# Patient Record
Sex: FEMALE | Race: WHITE | NOT HISPANIC OR LATINO
[De-identification: names, ages, dates, MRNs, and addresses within clinical notes are randomized per-mention and may not be internally consistent; named-entity substitution may affect disease eponyms.]

---

## 2017-01-04 ENCOUNTER — APPOINTMENT (OUTPATIENT)
Dept: INTERNAL MEDICINE | Facility: CLINIC | Age: 72
End: 2017-01-04

## 2017-01-30 ENCOUNTER — APPOINTMENT (OUTPATIENT)
Dept: DERMATOLOGY | Facility: CLINIC | Age: 72
End: 2017-01-30

## 2017-05-24 ENCOUNTER — APPOINTMENT (OUTPATIENT)
Dept: INTERNAL MEDICINE | Facility: CLINIC | Age: 72
End: 2017-05-24

## 2017-07-31 ENCOUNTER — APPOINTMENT (OUTPATIENT)
Dept: DERMATOLOGY | Facility: CLINIC | Age: 72
End: 2017-07-31

## 2017-08-07 ENCOUNTER — APPOINTMENT (OUTPATIENT)
Dept: INTERNAL MEDICINE | Facility: CLINIC | Age: 72
End: 2017-08-07
Payer: MEDICARE

## 2017-08-07 PROCEDURE — 99215 OFFICE O/P EST HI 40 MIN: CPT

## 2017-09-19 ENCOUNTER — APPOINTMENT (OUTPATIENT)
Dept: INTERNAL MEDICINE | Facility: CLINIC | Age: 72
End: 2017-09-19
Payer: MEDICARE

## 2017-09-19 PROCEDURE — 93000 ELECTROCARDIOGRAM COMPLETE: CPT

## 2017-09-19 PROCEDURE — G0008: CPT

## 2017-09-19 PROCEDURE — 99215 OFFICE O/P EST HI 40 MIN: CPT | Mod: 25

## 2017-09-19 PROCEDURE — 94010 BREATHING CAPACITY TEST: CPT

## 2017-09-19 PROCEDURE — G0439: CPT

## 2017-09-19 PROCEDURE — 90686 IIV4 VACC NO PRSV 0.5 ML IM: CPT

## 2017-09-19 PROCEDURE — 36415 COLL VENOUS BLD VENIPUNCTURE: CPT

## 2017-09-22 ENCOUNTER — APPOINTMENT (OUTPATIENT)
Dept: DERMATOLOGY | Facility: CLINIC | Age: 72
End: 2017-09-22
Payer: MEDICARE

## 2017-09-22 PROCEDURE — 99214 OFFICE O/P EST MOD 30 MIN: CPT

## 2018-03-23 ENCOUNTER — RESULT REVIEW (OUTPATIENT)
Age: 73
End: 2018-03-23

## 2018-03-23 ENCOUNTER — APPOINTMENT (OUTPATIENT)
Dept: DERMATOLOGY | Facility: CLINIC | Age: 73
End: 2018-03-23
Payer: MEDICARE

## 2018-03-23 PROCEDURE — 11100 BX SKIN SUBCUTANEOUS&/MUCOUS MEMBRANE 1 LESION: CPT

## 2018-03-23 PROCEDURE — 99213 OFFICE O/P EST LOW 20 MIN: CPT | Mod: 25

## 2018-03-29 ENCOUNTER — APPOINTMENT (OUTPATIENT)
Dept: INTERNAL MEDICINE | Facility: CLINIC | Age: 73
End: 2018-03-29
Payer: MEDICARE

## 2018-03-29 PROCEDURE — G0009: CPT

## 2018-03-29 PROCEDURE — 90732 PPSV23 VACC 2 YRS+ SUBQ/IM: CPT

## 2018-03-29 PROCEDURE — 99214 OFFICE O/P EST MOD 30 MIN: CPT | Mod: 25

## 2018-03-29 PROCEDURE — 36415 COLL VENOUS BLD VENIPUNCTURE: CPT

## 2018-08-10 ENCOUNTER — RESULT REVIEW (OUTPATIENT)
Age: 73
End: 2018-08-10

## 2018-09-26 ENCOUNTER — APPOINTMENT (OUTPATIENT)
Dept: DERMATOLOGY | Facility: CLINIC | Age: 73
End: 2018-09-26
Payer: MEDICARE

## 2018-09-26 PROCEDURE — 99214 OFFICE O/P EST MOD 30 MIN: CPT

## 2018-10-10 ENCOUNTER — APPOINTMENT (OUTPATIENT)
Dept: INTERNAL MEDICINE | Facility: CLINIC | Age: 73
End: 2018-10-10
Payer: MEDICARE

## 2018-10-10 VITALS
HEIGHT: 65 IN | WEIGHT: 169 LBS | DIASTOLIC BLOOD PRESSURE: 80 MMHG | BODY MASS INDEX: 28.16 KG/M2 | SYSTOLIC BLOOD PRESSURE: 120 MMHG

## 2018-10-10 DIAGNOSIS — R53.83 OTHER FATIGUE: ICD-10-CM

## 2018-10-10 PROCEDURE — 90662 IIV NO PRSV INCREASED AG IM: CPT

## 2018-10-10 PROCEDURE — G0008: CPT

## 2018-10-10 PROCEDURE — 99215 OFFICE O/P EST HI 40 MIN: CPT | Mod: 25

## 2018-10-10 NOTE — PLAN
[FreeTextEntry1] : PT HERE FOR FOLLOW UP ON BP ND CHOL WILL CHECK LBS  FTGFIUE CHECK THYROID FXN\par NEEDS BONE DENSITY\par FLU SHOT HIGH DOSE GIVNE

## 2018-10-10 NOTE — HISTORY OF PRESENT ILLNESS
[FreeTextEntry1] : F/U  on  B/P [de-identified] : PT HERE FOR FOLLOW UP FEELS OK NO TIO R ComplainT s \par HAS SEVERAL ISUES ON HER LIST TODAY\par

## 2018-10-11 LAB
25(OH)D3 SERPL-MCNC: 32.9 NG/ML
ALBUMIN SERPL ELPH-MCNC: 4.4 G/DL
ALP BLD-CCNC: 69 U/L
ALT SERPL-CCNC: 13 U/L
ANION GAP SERPL CALC-SCNC: 13 MMOL/L
AST SERPL-CCNC: 19 U/L
BASOPHILS # BLD AUTO: 0.02 K/UL
BASOPHILS NFR BLD AUTO: 0.3 %
BILIRUB SERPL-MCNC: 0.3 MG/DL
BUN SERPL-MCNC: 26 MG/DL
CALCIUM SERPL-MCNC: 9.7 MG/DL
CHLORIDE SERPL-SCNC: 104 MMOL/L
CHOLEST SERPL-MCNC: 192 MG/DL
CHOLEST/HDLC SERPL: 3 RATIO
CO2 SERPL-SCNC: 27 MMOL/L
CREAT SERPL-MCNC: 0.72 MG/DL
EOSINOPHIL # BLD AUTO: 0.1 K/UL
EOSINOPHIL NFR BLD AUTO: 1.4 %
GLUCOSE SERPL-MCNC: 87 MG/DL
HBA1C MFR BLD HPLC: 5.6 %
HCT VFR BLD CALC: 43.9 %
HDLC SERPL-MCNC: 65 MG/DL
HGB BLD-MCNC: 14.3 G/DL
IMM GRANULOCYTES NFR BLD AUTO: 0.1 %
LDLC SERPL CALC-MCNC: 106 MG/DL
LYMPHOCYTES # BLD AUTO: 1.56 K/UL
LYMPHOCYTES NFR BLD AUTO: 22.6 %
MAN DIFF?: NORMAL
MCHC RBC-ENTMCNC: 31.5 PG
MCHC RBC-ENTMCNC: 32.6 GM/DL
MCV RBC AUTO: 96.7 FL
MONOCYTES # BLD AUTO: 0.27 K/UL
MONOCYTES NFR BLD AUTO: 3.9 %
NEUTROPHILS # BLD AUTO: 4.95 K/UL
NEUTROPHILS NFR BLD AUTO: 71.7 %
PLATELET # BLD AUTO: 244 K/UL
POTASSIUM SERPL-SCNC: 4.4 MMOL/L
PROT SERPL-MCNC: 6.7 G/DL
RBC # BLD: 4.54 M/UL
RBC # FLD: 13.6 %
SODIUM SERPL-SCNC: 144 MMOL/L
TRIGL SERPL-MCNC: 107 MG/DL
WBC # FLD AUTO: 6.91 K/UL

## 2018-10-14 LAB
T4 SERPL-MCNC: 6.9 UG/DL
TSH SERPL-ACNC: 1.57 UIU/ML

## 2018-11-07 ENCOUNTER — APPOINTMENT (OUTPATIENT)
Dept: PULMONOLOGY | Facility: CLINIC | Age: 73
End: 2018-11-07
Payer: MEDICARE

## 2018-11-07 VITALS
BODY MASS INDEX: 29.23 KG/M2 | HEART RATE: 89 BPM | DIASTOLIC BLOOD PRESSURE: 80 MMHG | WEIGHT: 165 LBS | HEIGHT: 63 IN | OXYGEN SATURATION: 95 % | SYSTOLIC BLOOD PRESSURE: 130 MMHG

## 2018-11-07 DIAGNOSIS — C44.319 BASAL CELL CARCINOMA OF SKIN OF OTHER PARTS OF FACE: ICD-10-CM

## 2018-11-07 DIAGNOSIS — M25.569 PAIN IN UNSPECIFIED KNEE: ICD-10-CM

## 2018-11-07 PROCEDURE — 99204 OFFICE O/P NEW MOD 45 MIN: CPT | Mod: 25

## 2018-11-07 PROCEDURE — 94060 EVALUATION OF WHEEZING: CPT

## 2018-11-07 PROCEDURE — 94664 DEMO&/EVAL PT USE INHALER: CPT | Mod: 59

## 2018-11-27 ENCOUNTER — LABORATORY RESULT (OUTPATIENT)
Age: 73
End: 2018-11-27

## 2018-11-29 LAB
BASOPHILS # BLD AUTO: 0.03 K/UL
BASOPHILS NFR BLD AUTO: 0.5 %
EOSINOPHIL # BLD AUTO: 0.19 K/UL
EOSINOPHIL NFR BLD AUTO: 2.9 %
HCT VFR BLD CALC: 43 %
HGB BLD-MCNC: 13.9 G/DL
IMM GRANULOCYTES NFR BLD AUTO: 0.2 %
LYMPHOCYTES # BLD AUTO: 1.98 K/UL
LYMPHOCYTES NFR BLD AUTO: 30 %
MAN DIFF?: NORMAL
MCHC RBC-ENTMCNC: 31.5 PG
MCHC RBC-ENTMCNC: 32.3 GM/DL
MCV RBC AUTO: 97.5 FL
MONOCYTES # BLD AUTO: 0.33 K/UL
MONOCYTES NFR BLD AUTO: 5 %
NEUTROPHILS # BLD AUTO: 4.05 K/UL
NEUTROPHILS NFR BLD AUTO: 61.4 %
PLATELET # BLD AUTO: 254 K/UL
RBC # BLD: 4.41 M/UL
RBC # FLD: 14.1 %
WBC # FLD AUTO: 6.59 K/UL

## 2018-12-03 LAB
A ALTERNATA IGE QN: <0.1 KUA/L
A FLAVUS AB FLD QL: NEGATIVE
A FUMIGATUS AB FLD QL: NEGATIVE
A FUMIGATUS IGE QN: <0.1 KUA/L
A NIGER AB FLD QL: NEGATIVE
BERMUDA GRASS IGE QN: 0.19 KUA/L
BOXELDER IGE QN: 0.18 KUA/L
C HERBARUM IGE QN: <0.1 KUA/L
CALIF WALNUT IGE QN: <0.1 KUA/L
CAT DANDER IGE QN: 0.81 KUA/L
CMN PIGWEED IGE QN: <0.1 KUA/L
COMMON RAGWEED IGE QN: <0.1 KUA/L
COTTONWOOD IGE QN: <0.1 KUA/L
D FARINAE IGE QN: 7.29 KUA/L
D PTERONYSS IGE QN: 7.84 KUA/L
DEPRECATED A ALTERNATA IGE RAST QL: 0
DEPRECATED A FUMIGATUS IGE RAST QL: 0
DEPRECATED BERMUDA GRASS IGE RAST QL: NORMAL
DEPRECATED BOXELDER IGE RAST QL: NORMAL
DEPRECATED C HERBARUM IGE RAST QL: 0
DEPRECATED CAT DANDER IGE RAST QL: ABNORMAL
DEPRECATED COMMON PIGWEED IGE RAST QL: 0
DEPRECATED COMMON RAGWEED IGE RAST QL: 0
DEPRECATED COTTONWOOD IGE RAST QL: 0
DEPRECATED D FARINAE IGE RAST QL: ABNORMAL
DEPRECATED D PTERONYSS IGE RAST QL: ABNORMAL
DEPRECATED DOG DANDER IGE RAST QL: ABNORMAL
DEPRECATED GOOSEFOOT IGE RAST QL: 0
DEPRECATED LONDON PLANE IGE RAST QL: 0
DEPRECATED MUGWORT IGE RAST QL: 0
DEPRECATED P NOTATUM IGE RAST QL: 0
DEPRECATED RED CEDAR IGE RAST QL: 0
DEPRECATED ROACH IGE RAST QL: 0
DEPRECATED SHEEP SORREL IGE RAST QL: 0
DEPRECATED SILVER BIRCH IGE RAST QL: ABNORMAL
DEPRECATED TIMOTHY IGE RAST QL: ABNORMAL
DEPRECATED WHITE ASH IGE RAST QL: 0
DEPRECATED WHITE OAK IGE RAST QL: ABNORMAL
DOG DANDER IGE QN: 1.41 KUA/L
GOOSEFOOT IGE QN: <0.1 KUA/L
LONDON PLANE IGE QN: <0.1 KUA/L
MUGWORT IGE QN: <0.1 KUA/L
MULBERRY (T70) CLASS: 0
MULBERRY (T70) CONC: <0.1 KUA/L
P NOTATUM IGE QN: <0.1 KUA/L
RED CEDAR IGE QN: <0.1 KUA/L
ROACH IGE QN: <0.1 KUA/L
SHEEP SORREL IGE QN: <0.1 KUA/L
SILVER BIRCH IGE QN: 1.4 KUA/L
TIMOTHY IGE QN: 0.61 KUA/L
TOTAL IGE SMQN RAST: 93 KU/L
TREE ALLERG MIX1 IGE QL: 0
WHITE ASH IGE QN: <0.1 KUA/L
WHITE ELM IGE QN: 0
WHITE ELM IGE QN: <0.1 KUA/L
WHITE OAK IGE QN: 0.98 KUA/L

## 2019-01-08 ENCOUNTER — APPOINTMENT (OUTPATIENT)
Dept: PULMONOLOGY | Facility: CLINIC | Age: 74
End: 2019-01-08
Payer: MEDICARE

## 2019-01-08 VITALS
HEART RATE: 86 BPM | SYSTOLIC BLOOD PRESSURE: 142 MMHG | BODY MASS INDEX: 29.94 KG/M2 | OXYGEN SATURATION: 95 % | DIASTOLIC BLOOD PRESSURE: 82 MMHG | WEIGHT: 169 LBS

## 2019-01-08 PROCEDURE — 99214 OFFICE O/P EST MOD 30 MIN: CPT

## 2019-01-08 NOTE — HISTORY OF PRESENT ILLNESS
[FreeTextEntry1] : Feeling well. No further wheezing and cough. No SOB. On symbicort BID.\par \par Usually has more difficulty in cold weather with bronchitis episodes. \par \par Received cxr just now that she had on 10/18/18: peribronchial thickening.\par \par Allergy testing reviewed below. She has a dog.

## 2019-01-08 NOTE — CONSULT LETTER
[Dear  ___] : Dear  [unfilled], [Courtesy Letter:] : I had the pleasure of seeing your patient, [unfilled], in my office today. [Consult Closing:] : Thank you very much for allowing me to participate in the care of this patient.  If you have any questions, please do not hesitate to contact me.

## 2019-01-08 NOTE — PHYSICAL EXAM
[General Appearance - In No Acute Distress] : no acute distress [Normal Conjunctiva] : the conjunctiva exhibited no abnormalities [Normal Oropharynx] : normal oropharynx [Neck Appearance] : the appearance of the neck was normal [] : the neck was supple [Heart Rate And Rhythm] : heart rate and rhythm were normal [Heart Sounds] : normal S1 and S2 [Abnormal Walk] : normal gait [Nail Clubbing] : no clubbing of the fingernails [Cyanosis, Localized] : no localized cyanosis [Cranial Nerves] : cranial nerves 2-12 were intact [Oriented To Time, Place, And Person] : oriented to person, place, and time [Impaired Insight] : insight and judgment were intact [Affect] : the affect was normal [Rate ___] : at [unfilled] breaths per minute [Clear Bilaterally] : the lungs were clear to auscultation bilaterally [Normal Breath Sounds] : normal bilateral breath sounds

## 2019-01-08 NOTE — ASSESSMENT
[FreeTextEntry1] : Asthma exacerbated by allergies as she is allergic to multiple stimuli. Has a dog at home. Also possibly post viral this Fall. Doing well now. Reports more issues during cold weather.

## 2019-03-25 ENCOUNTER — APPOINTMENT (OUTPATIENT)
Dept: INTERNAL MEDICINE | Facility: CLINIC | Age: 74
End: 2019-03-25
Payer: MEDICARE

## 2019-03-25 VITALS
WEIGHT: 138 LBS | DIASTOLIC BLOOD PRESSURE: 79 MMHG | HEART RATE: 81 BPM | HEIGHT: 63 IN | SYSTOLIC BLOOD PRESSURE: 162 MMHG | BODY MASS INDEX: 24.45 KG/M2

## 2019-03-25 DIAGNOSIS — J01.10 ACUTE FRONTAL SINUSITIS, UNSPECIFIED: ICD-10-CM

## 2019-03-25 PROCEDURE — 99214 OFFICE O/P EST MOD 30 MIN: CPT

## 2019-03-25 NOTE — REVIEW OF SYSTEMS
[Nasal Discharge] : nasal discharge [Postnasal Drip] : postnasal drip [Headache] : headache [Negative] : Heme/Lymph

## 2019-03-25 NOTE — HISTORY OF PRESENT ILLNESS
[FreeTextEntry1] : BP CHECK /HEADACHES [de-identified] : Ms. ELIZABETH GAGNON is a 73 year female with a PMH of asthma and hld comes to the office x 2 weeks of frontal headache. no other complaints at this time.

## 2019-03-25 NOTE — PHYSICAL EXAM

## 2019-03-25 NOTE — PLAN
[FreeTextEntry1] : In regards to patients frontal headache likely frontal sinusitis will start antibiotics and Flonase. \par \par BP slightly elevated in office, will re-check in 2-3 weeks\par \par Counseling included abnormal lab results, differential diagnoses, treatment options, risks and benefits, lifestyle changes, prognosis, current condition, medications, and dose adjustments. \par The patient was interactive, attentive, asked questions, and verbalized understanding

## 2019-03-27 ENCOUNTER — APPOINTMENT (OUTPATIENT)
Dept: DERMATOLOGY | Facility: CLINIC | Age: 74
End: 2019-03-27
Payer: MEDICARE

## 2019-03-27 PROCEDURE — 99214 OFFICE O/P EST MOD 30 MIN: CPT

## 2019-04-15 ENCOUNTER — NON-APPOINTMENT (OUTPATIENT)
Age: 74
End: 2019-04-15

## 2019-04-15 ENCOUNTER — APPOINTMENT (OUTPATIENT)
Dept: INTERNAL MEDICINE | Facility: CLINIC | Age: 74
End: 2019-04-15
Payer: MEDICARE

## 2019-04-15 VITALS
HEART RATE: 71 BPM | OXYGEN SATURATION: 98 % | HEIGHT: 63 IN | WEIGHT: 170 LBS | TEMPERATURE: 98.7 F | SYSTOLIC BLOOD PRESSURE: 134 MMHG | DIASTOLIC BLOOD PRESSURE: 84 MMHG | BODY MASS INDEX: 30.12 KG/M2

## 2019-04-15 PROCEDURE — 93000 ELECTROCARDIOGRAM COMPLETE: CPT

## 2019-04-15 PROCEDURE — 99213 OFFICE O/P EST LOW 20 MIN: CPT | Mod: 25

## 2019-04-15 PROCEDURE — G0447 BEHAVIOR COUNSEL OBESITY 15M: CPT | Mod: 59

## 2019-04-15 PROCEDURE — 36415 COLL VENOUS BLD VENIPUNCTURE: CPT

## 2019-04-15 PROCEDURE — G0439: CPT

## 2019-04-15 PROCEDURE — G0444 DEPRESSION SCREEN ANNUAL: CPT | Mod: 59

## 2019-04-15 RX ORDER — DOXYCYCLINE HYCLATE 100 MG/1
100 CAPSULE ORAL
Qty: 14 | Refills: 0 | Status: DISCONTINUED | COMMUNITY
Start: 2019-03-25 | End: 2019-04-15

## 2019-04-15 NOTE — HISTORY OF PRESENT ILLNESS
[FreeTextEntry1] : physical [de-identified] : Ms. ELIZABETH GAGNON is a 73 year female with a PMH of asthma and hld comes to the office for Physcial exam. Patient feels well and has no complaints at this time.

## 2019-04-15 NOTE — PHYSICAL EXAM
[No Acute Distress] : no acute distress [Well Nourished] : well nourished [Well Developed] : well developed [Well-Appearing] : well-appearing [Normal Sclera/Conjunctiva] : normal sclera/conjunctiva [PERRL] : pupils equal round and reactive to light [EOMI] : extraocular movements intact [Normal Outer Ear/Nose] : the outer ears and nose were normal in appearance [Normal Oropharynx] : the oropharynx was normal [No JVD] : no jugular venous distention [Supple] : supple [No Lymphadenopathy] : no lymphadenopathy [Thyroid Normal, No Nodules] : the thyroid was normal and there were no nodules present [No Respiratory Distress] : no respiratory distress  [Clear to Auscultation] : lungs were clear to auscultation bilaterally [No Accessory Muscle Use] : no accessory muscle use [Normal Rate] : normal rate  [Normal S1, S2] : normal S1 and S2 [Regular Rhythm] : with a regular rhythm [No Murmur] : no murmur heard [No Carotid Bruits] : no carotid bruits [No Abdominal Bruit] : a ~M bruit was not heard ~T in the abdomen [Pedal Pulses Present] : the pedal pulses are present [No Varicosities] : no varicosities [No Edema] : there was no peripheral edema [No Extremity Clubbing/Cyanosis] : no extremity clubbing/cyanosis [No Palpable Aorta] : no palpable aorta [Soft] : abdomen soft [Non Tender] : non-tender [Non-distended] : non-distended [No Masses] : no abdominal mass palpated [No HSM] : no HSM [Normal Bowel Sounds] : normal bowel sounds [Normal Posterior Cervical Nodes] : no posterior cervical lymphadenopathy [No CVA Tenderness] : no CVA  tenderness [Normal Anterior Cervical Nodes] : no anterior cervical lymphadenopathy [No Spinal Tenderness] : no spinal tenderness [No Joint Swelling] : no joint swelling [Grossly Normal Strength/Tone] : grossly normal strength/tone [No Rash] : no rash [Normal Gait] : normal gait [Coordination Grossly Intact] : coordination grossly intact [No Focal Deficits] : no focal deficits [Normal Affect] : the affect was normal [Normal Insight/Judgement] : insight and judgment were intact

## 2019-04-15 NOTE — PLAN
[FreeTextEntry1] : In regards to patients Physical exam, routine blood work drawn, will review results with patient. EKG reviewed in office\par \par Counseling included abnormal lab results, differential diagnoses, treatment options, risks and benefits, lifestyle changes, prognosis, current condition, medications, and dose adjustments. \par The patient was interactive, attentive, asked questions, and verbalized understanding

## 2019-04-15 NOTE — COUNSELING
[Weight management counseling provided] : Weight management [Healthy eating counseling provided] : healthy eating [Activity counseling provided] : activity [Good understanding] : Patient has a good understanding of disease, goals and obesity follow-up plan [ - Behavioral Counseling for Obesity (Face-to-Face for 15 Minutes)] : Behavioral Counseling for Obesity (Face-to-Face for 15 Minutes) [ - Annual Depression Screening] : Annual Depression Screening

## 2019-04-15 NOTE — HEALTH RISK ASSESSMENT
[Good] : ~his/her~  mood as  good [No falls in past year] : Patient reported no falls in the past year [0] : 2) Feeling down, depressed, or hopeless: Not at all (0) [Patient declined Retinal Exam] : Patient declined Retinal Exam. [Patient declined Low Dose CT Scan] : Patient declined Low Dose CT Scan [Patient reported PAP Smear was normal] : Patient reported PAP Smear was normal [Patient reported mammogram was normal] : Patient reported mammogram was normal [Patient reported bone density results were normal] : Patient reported bone density results were normal [Patient reported colonoscopy was normal] : Patient reported colonoscopy was normal [HIV test declined] : HIV test declined [Hepatitis C test declined] : Hepatitis C test declined [Reviewed updated] : Reviewed updated [Designated Healthcare Proxy] : Designated healthcare proxy [Name: ___] : Health Care Proxy's Name: [unfilled]  [Aggressive treatment] : aggressive treatment [I will adhere to the patient's wishes as expressed in the advance directive except as noted below.] : I will adhere to the patient's wishes as expressed in the advance directive except as noted below [de-identified] : Pulmonologist [] : No [[id number]] : 0 [MammogramDate] : 08/18 [PapSmearDate] : 07/17 [BoneDensityDate] : 10/18 [ColonoscopyDate] : 05/12 [AdvancecareDate] : 04/19

## 2019-04-16 LAB
25(OH)D3 SERPL-MCNC: 60.8 NG/ML
ALBUMIN SERPL ELPH-MCNC: 4.3 G/DL
ALP BLD-CCNC: 74 U/L
ALT SERPL-CCNC: 19 U/L
ANION GAP SERPL CALC-SCNC: 12 MMOL/L
AST SERPL-CCNC: 25 U/L
BASOPHILS # BLD AUTO: 0.04 K/UL
BASOPHILS NFR BLD AUTO: 0.7 %
BILIRUB SERPL-MCNC: 0.4 MG/DL
BUN SERPL-MCNC: 19 MG/DL
CALCIUM SERPL-MCNC: 9.6 MG/DL
CHLORIDE SERPL-SCNC: 103 MMOL/L
CHOLEST SERPL-MCNC: 212 MG/DL
CHOLEST/HDLC SERPL: 2.9 RATIO
CO2 SERPL-SCNC: 27 MMOL/L
CREAT SERPL-MCNC: 0.78 MG/DL
EOSINOPHIL # BLD AUTO: 0.17 K/UL
EOSINOPHIL NFR BLD AUTO: 3 %
ESTIMATED AVERAGE GLUCOSE: 114 MG/DL
GLUCOSE SERPL-MCNC: 91 MG/DL
HBA1C MFR BLD HPLC: 5.6 %
HCT VFR BLD CALC: 44.1 %
HDLC SERPL-MCNC: 73 MG/DL
HGB BLD-MCNC: 14 G/DL
IMM GRANULOCYTES NFR BLD AUTO: 0.4 %
LDLC SERPL CALC-MCNC: 120 MG/DL
LYMPHOCYTES # BLD AUTO: 1.51 K/UL
LYMPHOCYTES NFR BLD AUTO: 26.5 %
MAN DIFF?: NORMAL
MCHC RBC-ENTMCNC: 30.8 PG
MCHC RBC-ENTMCNC: 31.7 GM/DL
MCV RBC AUTO: 97.1 FL
MONOCYTES # BLD AUTO: 0.39 K/UL
MONOCYTES NFR BLD AUTO: 6.8 %
NEUTROPHILS # BLD AUTO: 3.57 K/UL
NEUTROPHILS NFR BLD AUTO: 62.6 %
PLATELET # BLD AUTO: 232 K/UL
POTASSIUM SERPL-SCNC: 4.4 MMOL/L
PROT SERPL-MCNC: 6.7 G/DL
RBC # BLD: 4.54 M/UL
RBC # FLD: 13.2 %
SODIUM SERPL-SCNC: 142 MMOL/L
TRIGL SERPL-MCNC: 94 MG/DL
TSH SERPL-ACNC: 1.41 UIU/ML
WBC # FLD AUTO: 5.7 K/UL

## 2019-04-22 ENCOUNTER — RX RENEWAL (OUTPATIENT)
Age: 74
End: 2019-04-22

## 2019-05-17 ENCOUNTER — APPOINTMENT (OUTPATIENT)
Dept: PULMONOLOGY | Facility: CLINIC | Age: 74
End: 2019-05-17
Payer: MEDICARE

## 2019-05-17 VITALS
OXYGEN SATURATION: 96 % | SYSTOLIC BLOOD PRESSURE: 120 MMHG | DIASTOLIC BLOOD PRESSURE: 70 MMHG | WEIGHT: 173 LBS | BODY MASS INDEX: 30.65 KG/M2 | HEART RATE: 74 BPM | HEIGHT: 63 IN

## 2019-05-17 DIAGNOSIS — J45.909 UNSPECIFIED ASTHMA, UNCOMPLICATED: ICD-10-CM

## 2019-05-17 PROCEDURE — 99214 OFFICE O/P EST MOD 30 MIN: CPT

## 2019-08-08 ENCOUNTER — MEDICATION RENEWAL (OUTPATIENT)
Age: 74
End: 2019-08-08

## 2019-10-08 ENCOUNTER — TRANSCRIPTION ENCOUNTER (OUTPATIENT)
Age: 74
End: 2019-10-08

## 2019-10-08 ENCOUNTER — APPOINTMENT (OUTPATIENT)
Dept: INTERNAL MEDICINE | Facility: CLINIC | Age: 74
End: 2019-10-08
Payer: MEDICARE

## 2019-10-08 VITALS
BODY MASS INDEX: 30.65 KG/M2 | SYSTOLIC BLOOD PRESSURE: 138 MMHG | HEIGHT: 63 IN | WEIGHT: 173 LBS | DIASTOLIC BLOOD PRESSURE: 80 MMHG

## 2019-10-08 PROCEDURE — 99214 OFFICE O/P EST MOD 30 MIN: CPT | Mod: 25

## 2019-10-08 PROCEDURE — 36415 COLL VENOUS BLD VENIPUNCTURE: CPT

## 2019-10-08 PROCEDURE — 90686 IIV4 VACC NO PRSV 0.5 ML IM: CPT

## 2019-10-08 PROCEDURE — G0008: CPT

## 2019-10-08 RX ORDER — FLUTICASONE PROPIONATE 50 UG/1
50 SPRAY, METERED NASAL TWICE DAILY
Qty: 16 | Refills: 0 | Status: COMPLETED | COMMUNITY
Start: 2019-03-25 | End: 2019-10-08

## 2019-10-08 NOTE — HISTORY OF PRESENT ILLNESS
[FreeTextEntry1] : bp check / flu shot [de-identified] : Ms. ELIZABETH GAGNON is a 73 year female with a PMH of asthma and hld comes to the office for follow up of BP and Flu shot. Patient feels well and has no complaints at this time.

## 2019-10-08 NOTE — PHYSICAL EXAM
[No Acute Distress] : no acute distress [Well Nourished] : well nourished [Well Developed] : well developed [Well-Appearing] : well-appearing [Normal Sclera/Conjunctiva] : normal sclera/conjunctiva [PERRL] : pupils equal round and reactive to light [EOMI] : extraocular movements intact [Normal Outer Ear/Nose] : the outer ears and nose were normal in appearance [Normal Oropharynx] : the oropharynx was normal [No JVD] : no jugular venous distention [Supple] : supple [No Lymphadenopathy] : no lymphadenopathy [Thyroid Normal, No Nodules] : the thyroid was normal and there were no nodules present [No Accessory Muscle Use] : no accessory muscle use [No Respiratory Distress] : no respiratory distress  [Clear to Auscultation] : lungs were clear to auscultation bilaterally [Normal Rate] : normal rate  [Regular Rhythm] : with a regular rhythm [Normal S1, S2] : normal S1 and S2 [No Murmur] : no murmur heard [No Carotid Bruits] : no carotid bruits [No Abdominal Bruit] : a ~M bruit was not heard ~T in the abdomen [No Varicosities] : no varicosities [Pedal Pulses Present] : the pedal pulses are present [No Edema] : there was no peripheral edema [No Palpable Aorta] : no palpable aorta [Soft] : abdomen soft [No Extremity Clubbing/Cyanosis] : no extremity clubbing/cyanosis [Non Tender] : non-tender [Non-distended] : non-distended [No Masses] : no abdominal mass palpated [No HSM] : no HSM [Normal Bowel Sounds] : normal bowel sounds [Normal Posterior Cervical Nodes] : no posterior cervical lymphadenopathy [Normal Anterior Cervical Nodes] : no anterior cervical lymphadenopathy [No CVA Tenderness] : no CVA  tenderness [No Spinal Tenderness] : no spinal tenderness [No Joint Swelling] : no joint swelling [Grossly Normal Strength/Tone] : grossly normal strength/tone [No Rash] : no rash [Coordination Grossly Intact] : coordination grossly intact [No Focal Deficits] : no focal deficits [Normal Gait] : normal gait [Normal Insight/Judgement] : insight and judgment were intact [Normal Affect] : the affect was normal

## 2019-10-08 NOTE — PLAN
[FreeTextEntry1] : patient's BP well controlled with current medication. will continue current  regimen \par \par In regards to patient's HLD, lifestyle modifications discussed will retest lipids in 6 months. \par \par Patient received flu vaccine today. Patient advised of adverse effects of medication including but not limited to muscle soreness at site of injection and general malaise \par \par Counseling included abnormal lab results, differential diagnoses, treatment options, risks and benefits, lifestyle changes, prognosis, current condition, medications, and dose adjustments. \par The patient was interactive, attentive, asked questions, and verbalized understanding

## 2019-10-09 LAB
CHOLEST SERPL-MCNC: 230 MG/DL
CHOLEST/HDLC SERPL: 3.3 RATIO
HDLC SERPL-MCNC: 69 MG/DL
LDLC SERPL CALC-MCNC: 136 MG/DL
TRIGL SERPL-MCNC: 125 MG/DL

## 2019-10-21 ENCOUNTER — APPOINTMENT (OUTPATIENT)
Dept: DERMATOLOGY | Facility: CLINIC | Age: 74
End: 2019-10-21
Payer: MEDICARE

## 2019-10-21 PROCEDURE — 99213 OFFICE O/P EST LOW 20 MIN: CPT

## 2019-10-29 ENCOUNTER — APPOINTMENT (OUTPATIENT)
Dept: PULMONOLOGY | Facility: CLINIC | Age: 74
End: 2019-10-29
Payer: MEDICARE

## 2019-10-29 VITALS
DIASTOLIC BLOOD PRESSURE: 70 MMHG | SYSTOLIC BLOOD PRESSURE: 130 MMHG | HEART RATE: 70 BPM | HEIGHT: 63 IN | OXYGEN SATURATION: 98 % | BODY MASS INDEX: 31.01 KG/M2 | WEIGHT: 175 LBS

## 2019-10-29 DIAGNOSIS — R06.2 WHEEZING: ICD-10-CM

## 2019-10-29 DIAGNOSIS — R93.89 ABNORMAL FINDINGS ON DIAGNOSTIC IMAGING OF OTHER SPECIFIED BODY STRUCTURES: ICD-10-CM

## 2019-10-29 PROCEDURE — 99214 OFFICE O/P EST MOD 30 MIN: CPT | Mod: 25

## 2019-10-29 PROCEDURE — 94010 BREATHING CAPACITY TEST: CPT

## 2020-02-27 ENCOUNTER — APPOINTMENT (OUTPATIENT)
Dept: PULMONOLOGY | Facility: CLINIC | Age: 75
End: 2020-02-27

## 2020-04-23 ENCOUNTER — APPOINTMENT (OUTPATIENT)
Dept: INTERNAL MEDICINE | Facility: CLINIC | Age: 75
End: 2020-04-23

## 2020-06-19 ENCOUNTER — APPOINTMENT (OUTPATIENT)
Dept: INTERNAL MEDICINE | Facility: CLINIC | Age: 75
End: 2020-06-19
Payer: MEDICARE

## 2020-06-19 VITALS
WEIGHT: 180 LBS | DIASTOLIC BLOOD PRESSURE: 89 MMHG | SYSTOLIC BLOOD PRESSURE: 140 MMHG | TEMPERATURE: 97.9 F | HEIGHT: 63 IN | BODY MASS INDEX: 31.89 KG/M2

## 2020-06-19 PROCEDURE — 90471 IMMUNIZATION ADMIN: CPT

## 2020-06-19 PROCEDURE — 99214 OFFICE O/P EST MOD 30 MIN: CPT | Mod: CS,25

## 2020-06-19 PROCEDURE — 90715 TDAP VACCINE 7 YRS/> IM: CPT | Mod: GY

## 2020-06-19 NOTE — HISTORY OF PRESENT ILLNESS
[FreeTextEntry1] : tdap vaccine [de-identified] : Ms. ELIZABETH GAGNON is a 75 year female with a PMH of asthma and hld comes to the office for follow up of BP and TDAP shot. Patient feels well and has no complaints at this time.

## 2020-06-19 NOTE — PHYSICAL EXAM
[Well Nourished] : well nourished [No Acute Distress] : no acute distress [Well-Appearing] : well-appearing [Normal Sclera/Conjunctiva] : normal sclera/conjunctiva [Well Developed] : well developed [PERRL] : pupils equal round and reactive to light [EOMI] : extraocular movements intact [Normal Outer Ear/Nose] : the outer ears and nose were normal in appearance [Normal Oropharynx] : the oropharynx was normal [No Lymphadenopathy] : no lymphadenopathy [No JVD] : no jugular venous distention [Thyroid Normal, No Nodules] : the thyroid was normal and there were no nodules present [Supple] : supple [No Respiratory Distress] : no respiratory distress  [No Accessory Muscle Use] : no accessory muscle use [Clear to Auscultation] : lungs were clear to auscultation bilaterally [Normal S1, S2] : normal S1 and S2 [Normal Rate] : normal rate  [No Murmur] : no murmur heard [Regular Rhythm] : with a regular rhythm [No Abdominal Bruit] : a ~M bruit was not heard ~T in the abdomen [No Carotid Bruits] : no carotid bruits [No Varicosities] : no varicosities [No Palpable Aorta] : no palpable aorta [No Edema] : there was no peripheral edema [Pedal Pulses Present] : the pedal pulses are present [No Extremity Clubbing/Cyanosis] : no extremity clubbing/cyanosis [Non Tender] : non-tender [Soft] : abdomen soft [Non-distended] : non-distended [No HSM] : no HSM [No Masses] : no abdominal mass palpated [Normal Bowel Sounds] : normal bowel sounds [Normal Posterior Cervical Nodes] : no posterior cervical lymphadenopathy [Normal Anterior Cervical Nodes] : no anterior cervical lymphadenopathy [No Joint Swelling] : no joint swelling [No Spinal Tenderness] : no spinal tenderness [No CVA Tenderness] : no CVA  tenderness [No Rash] : no rash [Grossly Normal Strength/Tone] : grossly normal strength/tone [Coordination Grossly Intact] : coordination grossly intact [Normal Gait] : normal gait [No Focal Deficits] : no focal deficits [Normal Affect] : the affect was normal [Normal Insight/Judgement] : insight and judgment were intact

## 2020-06-19 NOTE — PLAN
[FreeTextEntry1] : Patient received TDAP vaccine today. Patient advised of adverse effects of medication including but not limited to muscle soreness at site of injection and general malaise \par \par Counseling included abnormal lab results, differential diagnoses, treatment options, risks and benefits, lifestyle changes, current condition, medications, and dose adjustments. \par The patient was interactive, attentive, asked questions, and verbalized understanding

## 2020-06-22 LAB
ALBUMIN SERPL ELPH-MCNC: 4.5 G/DL
ALP BLD-CCNC: 76 U/L
ALT SERPL-CCNC: 19 U/L
ANION GAP SERPL CALC-SCNC: 12 MMOL/L
AST SERPL-CCNC: 24 U/L
BASOPHILS # BLD AUTO: 0.04 K/UL
BASOPHILS NFR BLD AUTO: 0.6 %
BILIRUB SERPL-MCNC: 0.3 MG/DL
BUN SERPL-MCNC: 18 MG/DL
CALCIUM SERPL-MCNC: 9.7 MG/DL
CHLORIDE SERPL-SCNC: 104 MMOL/L
CHOLEST SERPL-MCNC: 207 MG/DL
CHOLEST/HDLC SERPL: 3.1 RATIO
CO2 SERPL-SCNC: 27 MMOL/L
CREAT SERPL-MCNC: 0.84 MG/DL
EOSINOPHIL # BLD AUTO: 0.17 K/UL
EOSINOPHIL NFR BLD AUTO: 2.4 %
GLUCOSE SERPL-MCNC: 76 MG/DL
HCT VFR BLD CALC: 42.9 %
HDLC SERPL-MCNC: 66 MG/DL
HGB BLD-MCNC: 13.4 G/DL
IMM GRANULOCYTES NFR BLD AUTO: 0.3 %
LDLC SERPL CALC-MCNC: 116 MG/DL
LYMPHOCYTES # BLD AUTO: 1.73 K/UL
LYMPHOCYTES NFR BLD AUTO: 24.6 %
MAN DIFF?: NORMAL
MCHC RBC-ENTMCNC: 31.2 GM/DL
MCHC RBC-ENTMCNC: 31.2 PG
MCV RBC AUTO: 100 FL
MONOCYTES # BLD AUTO: 0.47 K/UL
MONOCYTES NFR BLD AUTO: 6.7 %
NEUTROPHILS # BLD AUTO: 4.6 K/UL
NEUTROPHILS NFR BLD AUTO: 65.4 %
PLATELET # BLD AUTO: 240 K/UL
POTASSIUM SERPL-SCNC: 4.7 MMOL/L
PROT SERPL-MCNC: 6.3 G/DL
RBC # BLD: 4.29 M/UL
RBC # FLD: 13.3 %
SARS-COV-2 IGG SERPL IA-ACNC: 0.1 INDEX
SARS-COV-2 IGG SERPL QL IA: NEGATIVE
SODIUM SERPL-SCNC: 144 MMOL/L
TRIGL SERPL-MCNC: 126 MG/DL
WBC # FLD AUTO: 7.03 K/UL

## 2020-07-17 ENCOUNTER — NON-APPOINTMENT (OUTPATIENT)
Age: 75
End: 2020-07-17

## 2020-07-17 ENCOUNTER — APPOINTMENT (OUTPATIENT)
Dept: INTERNAL MEDICINE | Facility: CLINIC | Age: 75
End: 2020-07-17
Payer: MEDICARE

## 2020-07-17 VITALS
DIASTOLIC BLOOD PRESSURE: 84 MMHG | TEMPERATURE: 97.3 F | BODY MASS INDEX: 31.89 KG/M2 | HEIGHT: 63 IN | WEIGHT: 180 LBS | SYSTOLIC BLOOD PRESSURE: 128 MMHG

## 2020-07-17 PROCEDURE — 93000 ELECTROCARDIOGRAM COMPLETE: CPT

## 2020-07-17 PROCEDURE — 99214 OFFICE O/P EST MOD 30 MIN: CPT | Mod: 25

## 2020-07-17 PROCEDURE — 36415 COLL VENOUS BLD VENIPUNCTURE: CPT

## 2020-07-17 NOTE — PHYSICAL EXAM
[No Acute Distress] : no acute distress [Well Nourished] : well nourished [Well Developed] : well developed [Well-Appearing] : well-appearing [Normal Sclera/Conjunctiva] : normal sclera/conjunctiva [PERRL] : pupils equal round and reactive to light [EOMI] : extraocular movements intact [Normal Outer Ear/Nose] : the outer ears and nose were normal in appearance [Normal Oropharynx] : the oropharynx was normal [Supple] : supple [No Lymphadenopathy] : no lymphadenopathy [No JVD] : no jugular venous distention [Thyroid Normal, No Nodules] : the thyroid was normal and there were no nodules present [No Accessory Muscle Use] : no accessory muscle use [Clear to Auscultation] : lungs were clear to auscultation bilaterally [No Respiratory Distress] : no respiratory distress  [Normal Rate] : normal rate  [Regular Rhythm] : with a regular rhythm [Normal S1, S2] : normal S1 and S2 [No Carotid Bruits] : no carotid bruits [No Murmur] : no murmur heard [No Abdominal Bruit] : a ~M bruit was not heard ~T in the abdomen [Pedal Pulses Present] : the pedal pulses are present [No Varicosities] : no varicosities [No Palpable Aorta] : no palpable aorta [No Edema] : there was no peripheral edema [No Extremity Clubbing/Cyanosis] : no extremity clubbing/cyanosis [Soft] : abdomen soft [Non Tender] : non-tender [No Masses] : no abdominal mass palpated [Non-distended] : non-distended [Normal Bowel Sounds] : normal bowel sounds [No HSM] : no HSM [Normal Posterior Cervical Nodes] : no posterior cervical lymphadenopathy [Normal Anterior Cervical Nodes] : no anterior cervical lymphadenopathy [No CVA Tenderness] : no CVA  tenderness [No Joint Swelling] : no joint swelling [No Spinal Tenderness] : no spinal tenderness [Grossly Normal Strength/Tone] : grossly normal strength/tone [No Rash] : no rash [Coordination Grossly Intact] : coordination grossly intact [No Focal Deficits] : no focal deficits [Normal Gait] : normal gait [Normal Affect] : the affect was normal [Normal Insight/Judgement] : insight and judgment were intact

## 2020-07-17 NOTE — HISTORY OF PRESENT ILLNESS
[FreeTextEntry1] : check up [de-identified] : Ms. ELIZABETH GAGNON is a 75 year female with a PMH of asthma and hld comes to the office for follow up. 2 days ago she had a syncopal episode about 20 seconds that started after having a laughing episode of laughing.  Patient did not hit any part of her body, she just sat into a chair. When she woke up, she did not feel tired or confused.  Patient states that she has had similar episodes of "fainting" when she was younger.

## 2020-07-20 LAB
25(OH)D3 SERPL-MCNC: 38.7 NG/ML
ALBUMIN SERPL ELPH-MCNC: 4.4 G/DL
ALP BLD-CCNC: 70 U/L
ALT SERPL-CCNC: 19 U/L
ANION GAP SERPL CALC-SCNC: 16 MMOL/L
AST SERPL-CCNC: 24 U/L
BASOPHILS # BLD AUTO: 0.04 K/UL
BASOPHILS NFR BLD AUTO: 0.7 %
BILIRUB SERPL-MCNC: 0.5 MG/DL
BUN SERPL-MCNC: 16 MG/DL
CALCIUM SERPL-MCNC: 9.3 MG/DL
CHLORIDE SERPL-SCNC: 103 MMOL/L
CHOLEST SERPL-MCNC: 193 MG/DL
CHOLEST/HDLC SERPL: 2.6 RATIO
CO2 SERPL-SCNC: 24 MMOL/L
CREAT SERPL-MCNC: 0.9 MG/DL
EOSINOPHIL # BLD AUTO: 0.16 K/UL
EOSINOPHIL NFR BLD AUTO: 2.6 %
ESTIMATED AVERAGE GLUCOSE: 108 MG/DL
GLUCOSE SERPL-MCNC: 97 MG/DL
HBA1C MFR BLD HPLC: 5.4 %
HCT VFR BLD CALC: 43.3 %
HDLC SERPL-MCNC: 73 MG/DL
HGB BLD-MCNC: 14 G/DL
IMM GRANULOCYTES NFR BLD AUTO: 0.2 %
LDLC SERPL CALC-MCNC: 101 MG/DL
LYMPHOCYTES # BLD AUTO: 1.57 K/UL
LYMPHOCYTES NFR BLD AUTO: 25.9 %
MAGNESIUM SERPL-MCNC: 1.8 MG/DL
MAN DIFF?: NORMAL
MCHC RBC-ENTMCNC: 31.5 PG
MCHC RBC-ENTMCNC: 32.3 GM/DL
MCV RBC AUTO: 97.5 FL
MONOCYTES # BLD AUTO: 0.3 K/UL
MONOCYTES NFR BLD AUTO: 4.9 %
NEUTROPHILS # BLD AUTO: 3.99 K/UL
NEUTROPHILS NFR BLD AUTO: 65.7 %
PHOSPHATE SERPL-MCNC: 3.5 MG/DL
PLATELET # BLD AUTO: 234 K/UL
POTASSIUM SERPL-SCNC: 4.2 MMOL/L
PROT SERPL-MCNC: 6.6 G/DL
RBC # BLD: 4.44 M/UL
RBC # FLD: 13.2 %
SODIUM SERPL-SCNC: 142 MMOL/L
TRIGL SERPL-MCNC: 91 MG/DL
TSH SERPL-ACNC: 1.61 UIU/ML
WBC # FLD AUTO: 6.07 K/UL

## 2020-07-24 ENCOUNTER — NON-APPOINTMENT (OUTPATIENT)
Age: 75
End: 2020-07-24

## 2020-07-24 ENCOUNTER — APPOINTMENT (OUTPATIENT)
Dept: CARDIOLOGY | Facility: CLINIC | Age: 75
End: 2020-07-24
Payer: MEDICARE

## 2020-07-24 VITALS
SYSTOLIC BLOOD PRESSURE: 184 MMHG | HEIGHT: 65 IN | WEIGHT: 176 LBS | DIASTOLIC BLOOD PRESSURE: 94 MMHG | BODY MASS INDEX: 29.32 KG/M2 | RESPIRATION RATE: 12 BRPM | HEART RATE: 92 BPM | TEMPERATURE: 97.1 F

## 2020-07-24 PROCEDURE — 93000 ELECTROCARDIOGRAM COMPLETE: CPT

## 2020-07-24 PROCEDURE — 99202 OFFICE O/P NEW SF 15 MIN: CPT

## 2020-07-24 RX ORDER — MELOXICAM 15 MG/1
15 TABLET ORAL
Qty: 30 | Refills: 0 | Status: DISCONTINUED | COMMUNITY
Start: 2019-05-11 | End: 2020-07-24

## 2020-07-24 NOTE — PHYSICAL EXAM
[Normal Conjunctiva] : the conjunctiva exhibited no abnormalities [Heart Sounds] : normal S1 and S2 [Abnormal Walk] : normal gait [FreeTextEntry1] : well perfused [Oriented To Time, Place, And Person] : oriented to person, place, and time

## 2020-07-24 NOTE — HISTORY OF PRESENT ILLNESS
[FreeTextEntry1] : Ms. Chi is a 75 year old overweight woman with past medical history of Hyperlipidemia and Vasovagal syncope who presents for evaluation of syncope.\par \par The patient states that she first started to experience syncope in college which occurred when she was out in the heat, outside. Since that time ever so often she gets episodes of syncope which are very brief, lasts few seconds and often preceded by extreme heat, coughing/laughing, having a bowel movement or some trigger. The episodes last about 15 seconds and she denies any symptoms of palpitations, chest pain or shortness of breath prior to the syncope. She notices that she feels her "consciousness leaving" her and then she passes out. She denies ever hitting her head, and the syncope does not occur often, occurs sporadically in years. Reports that she does not exercise but is able to walk > 2 blocks at a time and does not experience angina or dyspnea. Denies leg swelling. Denies any history of myocardial infarction.

## 2020-07-24 NOTE — ASSESSMENT
[FreeTextEntry1] : Assessment:\par Ms. Chi is a 75 year old overweight woman with past medical history of Hyperlipidemia and Vasovagal syncope who presents for evaluation of syncope. The patient describes a longstanding history of what appears to be vasovagal syncope, based on the triggers of: heat, cough, bowel movement, etc. Physical exam unremarkable. EKG notable for occasional PVCs and nonspecific ST/T wave abnormalities. Blood pressure also noted to be elevated. Given risk factors will ensure no underlying structural heart disease or carotid disease. \par \par Recommendations:\par [] Syncope: Most likely vasovagal in etiology, explained to patient importance of maintaining adequate hydration and avoiding known triggers, if possible. Will check echocardiogram and carotid US. \par [] Elevated blood pressure: Other health care providers office visits with BP within normal limits. Patient reports some stress at home now. Repeat SBP in 160s in office. Recommended patient to keep daily log of BP and if > 140/90 to call office for medication initiation. Also recommended DASH diet plan as well. \par [] Hyperlipidemia: 10 yr ASCVD risk score of 33% , patient is on Simvastatin 40 mg daily.  Will plan to repeat lipid panel in 6 months and re-evaluate overall risk score after lifestyle modifications. \par [] Return to office: 1 month \par

## 2020-07-24 NOTE — REVIEW OF SYSTEMS
[Fever] : no fever [Chills] : no chills [Blurry Vision] : no blurred vision [Earache] : no earache [Shortness Of Breath] : no shortness of breath [Chest Pain] : no chest pain [Palpitations] : no palpitations [Cough] : no cough [Abdominal Pain] : no abdominal pain [Joint Pain] : no joint pain [Skin: A Rash] : no rash: [Dizziness] : no dizziness [Confusion] : no confusion was observed [Excessive Thirst] : no polydipsia [Easy Bruising] : no tendency for easy bruising

## 2020-08-07 ENCOUNTER — APPOINTMENT (OUTPATIENT)
Dept: CARDIOLOGY | Facility: CLINIC | Age: 75
End: 2020-08-07
Payer: MEDICARE

## 2020-08-07 PROCEDURE — 93306 TTE W/DOPPLER COMPLETE: CPT

## 2020-08-07 PROCEDURE — 93880 EXTRACRANIAL BILAT STUDY: CPT

## 2020-08-10 ENCOUNTER — APPOINTMENT (OUTPATIENT)
Dept: CARDIOLOGY | Facility: CLINIC | Age: 75
End: 2020-08-10

## 2020-08-12 ENCOUNTER — APPOINTMENT (OUTPATIENT)
Dept: INTERNAL MEDICINE | Facility: CLINIC | Age: 75
End: 2020-08-12
Payer: MEDICARE

## 2020-08-12 ENCOUNTER — RX RENEWAL (OUTPATIENT)
Age: 75
End: 2020-08-12

## 2020-08-12 VITALS
BODY MASS INDEX: 29.32 KG/M2 | SYSTOLIC BLOOD PRESSURE: 130 MMHG | HEART RATE: 80 BPM | TEMPERATURE: 98.2 F | DIASTOLIC BLOOD PRESSURE: 86 MMHG | WEIGHT: 176 LBS | HEIGHT: 65 IN

## 2020-08-12 PROCEDURE — 90471 IMMUNIZATION ADMIN: CPT

## 2020-08-12 PROCEDURE — 99214 OFFICE O/P EST MOD 30 MIN: CPT | Mod: 25

## 2020-08-12 PROCEDURE — 90750 HZV VACC RECOMBINANT IM: CPT | Mod: GY

## 2020-08-12 NOTE — PLAN
[FreeTextEntry1] : patient's BP well controlled without medication. will continue to monitor. \par \par Patient received dose 1 of 1 Shingrix vaccine today. Patient advised of adverse effects of medication including but not limited to muscle soreness at site of injection and general malaise \par \par Counseling included abnormal lab results, differential diagnoses, treatment options, risks and benefits, lifestyle changes, current condition, medications, and dose adjustments. \par The patient was interactive, attentive, asked questions, and verbalized understanding

## 2020-08-12 NOTE — HISTORY OF PRESENT ILLNESS
[FreeTextEntry1] : bp check [de-identified] : Ms. ELIZABETH GAGNON is a 75 year female with a PMH of asthma and hld comes to the office for follow up of chronic medical conditions and dose 1 of 1 SHingrix vaccine. Patient denies fever, cough SOB. No other complaints at this time.

## 2020-08-12 NOTE — PHYSICAL EXAM
[No Acute Distress] : no acute distress [Well Nourished] : well nourished [Well Developed] : well developed [Well-Appearing] : well-appearing [Normal Sclera/Conjunctiva] : normal sclera/conjunctiva [PERRL] : pupils equal round and reactive to light [EOMI] : extraocular movements intact [Normal Outer Ear/Nose] : the outer ears and nose were normal in appearance [Normal Oropharynx] : the oropharynx was normal [No JVD] : no jugular venous distention [No Lymphadenopathy] : no lymphadenopathy [Supple] : supple [No Respiratory Distress] : no respiratory distress  [Thyroid Normal, No Nodules] : the thyroid was normal and there were no nodules present [No Accessory Muscle Use] : no accessory muscle use [Clear to Auscultation] : lungs were clear to auscultation bilaterally [Normal Rate] : normal rate  [Regular Rhythm] : with a regular rhythm [Normal S1, S2] : normal S1 and S2 [No Carotid Bruits] : no carotid bruits [No Murmur] : no murmur heard [No Abdominal Bruit] : a ~M bruit was not heard ~T in the abdomen [Pedal Pulses Present] : the pedal pulses are present [No Edema] : there was no peripheral edema [No Varicosities] : no varicosities [No Palpable Aorta] : no palpable aorta [No Extremity Clubbing/Cyanosis] : no extremity clubbing/cyanosis [Soft] : abdomen soft [Non Tender] : non-tender [Non-distended] : non-distended [No Masses] : no abdominal mass palpated [No HSM] : no HSM [Normal Bowel Sounds] : normal bowel sounds [Normal Posterior Cervical Nodes] : no posterior cervical lymphadenopathy [Normal Anterior Cervical Nodes] : no anterior cervical lymphadenopathy [No CVA Tenderness] : no CVA  tenderness [No Spinal Tenderness] : no spinal tenderness [No Joint Swelling] : no joint swelling [Grossly Normal Strength/Tone] : grossly normal strength/tone [No Rash] : no rash [Coordination Grossly Intact] : coordination grossly intact [No Focal Deficits] : no focal deficits [Normal Gait] : normal gait [Normal Affect] : the affect was normal [Normal Insight/Judgement] : insight and judgment were intact

## 2020-08-21 ENCOUNTER — RESULT REVIEW (OUTPATIENT)
Age: 75
End: 2020-08-21

## 2020-08-26 ENCOUNTER — APPOINTMENT (OUTPATIENT)
Dept: CARDIOLOGY | Facility: CLINIC | Age: 75
End: 2020-08-26
Payer: MEDICARE

## 2020-08-26 VITALS
HEIGHT: 65 IN | HEART RATE: 80 BPM | DIASTOLIC BLOOD PRESSURE: 80 MMHG | RESPIRATION RATE: 12 BRPM | WEIGHT: 173 LBS | SYSTOLIC BLOOD PRESSURE: 146 MMHG | BODY MASS INDEX: 28.82 KG/M2 | TEMPERATURE: 97.2 F

## 2020-08-26 DIAGNOSIS — Z87.898 PERSONAL HISTORY OF OTHER SPECIFIED CONDITIONS: ICD-10-CM

## 2020-08-26 PROCEDURE — 99214 OFFICE O/P EST MOD 30 MIN: CPT

## 2020-08-26 RX ORDER — SIMVASTATIN 40 MG/1
40 TABLET, FILM COATED ORAL
Qty: 90 | Refills: 3 | Status: DISCONTINUED | COMMUNITY
Start: 2020-08-12 | End: 2020-08-26

## 2020-08-30 PROBLEM — Z87.898 HISTORY OF SYNCOPE: Status: ACTIVE | Noted: 2020-07-17

## 2020-08-30 NOTE — PHYSICAL EXAM
[Normal Conjunctiva] : the conjunctiva exhibited no abnormalities [Heart Sounds] : normal S1 and S2 [Abnormal Walk] : normal gait [Oriented To Time, Place, And Person] : oriented to person, place, and time [FreeTextEntry1] : well perfused

## 2020-08-30 NOTE — ASSESSMENT
[FreeTextEntry1] : Cardiac Studies:\par \par ECG (7/24/2020): normal sinus rhythm, left axis deviation, occasional PVCs, nonspecific ST/T wave abnormalities \par \par TTE (8/2020):\par LVEF 60-65%, normal LV diastolic function\par No obvious LV regional wall motion abnormalities\par Normal right ventricle size and systolic function\par Normal atria sizes\par Mild aortic root calcification, no aortic stenosis\par Mild MR\par No pericardial effusion\par \par Carotid Doppler (8/2020):\par No hemodynamically significant stenosis in left or right ICA\par \par Assessment:\par Ms. Chi is a 75 year old overweight woman with past medical history of Hyperlipidemia and Vasovagal syncope who presents for follow-up evaluation of history of syncope.  The patient describes a longstanding history of what appears to be vasovagal syncope, based on the triggers of: heat, cough, bowel movement, etc. Carotid Doppler unremarkable. Echo notable for normal LV systolic function, LVEF 60-65%, normal RV size and systolic function and no significant valvular disease. Physical exam remains unremarkable. Prior EKG notable for occasional PVCs and nonspecific ST/T wave abnormalities. Likely her syncope episodes in the past were vasovagal in etiology. Patient reports stable exercise tolerance > 4 METs and denies angina or dyspnea on exertion, less concern for ischemic heart disease at this time.\par \par Recommendations:\par [] Syncope: Most likely vasovagal in etiology, explained to patient importance of maintaining adequate hydration and avoiding known triggers, if possible. Patient will monitor symptoms. \par [] Hypertension: Newly diagnosed based on home BP log (scanned in) as well as office BP readings. Previous electrolyte panel unremarkable. Will start Amlodipine 5 mg daily, patient to continue to monitor BP at home.  Also recommended DASH diet plan as well. \par [] Hyperlipidemia: 10 yr ASCVD risk score of 33% , patient is on Simvastatin 40 mg daily, but given that this will interact with amlodipine will stop Simvastatin and start Atorvastatin 10 mg daily instead. \par [] Return to office: 1 month

## 2020-08-30 NOTE — HISTORY OF PRESENT ILLNESS
[FreeTextEntry1] : Ms. Chi is a 75 year old overweight woman with past medical history of Hyperlipidemia and Vasovagal syncope who presents for follow-up of syncope.\par \par Since her last visit she reports feeling well and has not had recurrent episodes of syncope. Denies exertional angina or dyspnea. Denies leg edema or paroxysmal nocturnal dyspnea. Denies palpitations or presyncope. Has kept BP log as recommended which shows BP > 130/80 at home. Denies headache.

## 2020-09-15 ENCOUNTER — MED ADMIN CHARGE (OUTPATIENT)
Age: 75
End: 2020-09-15

## 2020-09-15 ENCOUNTER — APPOINTMENT (OUTPATIENT)
Dept: INTERNAL MEDICINE | Facility: CLINIC | Age: 75
End: 2020-09-15
Payer: MEDICARE

## 2020-09-15 PROCEDURE — G0008: CPT

## 2020-09-15 PROCEDURE — 90662 IIV NO PRSV INCREASED AG IM: CPT

## 2020-10-07 ENCOUNTER — APPOINTMENT (OUTPATIENT)
Dept: CARDIOLOGY | Facility: CLINIC | Age: 75
End: 2020-10-07

## 2020-10-07 ENCOUNTER — APPOINTMENT (OUTPATIENT)
Dept: CARDIOLOGY | Facility: CLINIC | Age: 75
End: 2020-10-07
Payer: MEDICARE

## 2020-10-07 VITALS
DIASTOLIC BLOOD PRESSURE: 80 MMHG | TEMPERATURE: 97.8 F | WEIGHT: 177 LBS | BODY MASS INDEX: 29.49 KG/M2 | SYSTOLIC BLOOD PRESSURE: 140 MMHG | HEART RATE: 78 BPM | HEIGHT: 65 IN | RESPIRATION RATE: 15 BRPM

## 2020-10-07 PROCEDURE — 99214 OFFICE O/P EST MOD 30 MIN: CPT

## 2020-10-07 PROCEDURE — 93000 ELECTROCARDIOGRAM COMPLETE: CPT

## 2020-10-15 ENCOUNTER — APPOINTMENT (OUTPATIENT)
Dept: INTERNAL MEDICINE | Facility: CLINIC | Age: 75
End: 2020-10-15
Payer: MEDICARE

## 2020-10-15 VITALS
WEIGHT: 177 LBS | DIASTOLIC BLOOD PRESSURE: 90 MMHG | SYSTOLIC BLOOD PRESSURE: 130 MMHG | BODY MASS INDEX: 29.49 KG/M2 | TEMPERATURE: 98.8 F | HEIGHT: 65 IN

## 2020-10-15 PROCEDURE — 90471 IMMUNIZATION ADMIN: CPT

## 2020-10-15 PROCEDURE — 99214 OFFICE O/P EST MOD 30 MIN: CPT | Mod: 25

## 2020-10-15 PROCEDURE — 90750 HZV VACC RECOMBINANT IM: CPT | Mod: GY

## 2020-10-15 NOTE — PLAN
[FreeTextEntry1] : patient's BP well controlled without medication. will continue to monitor. \par \par Patient received dose 2 of 2 Shingrix vaccine today. Patient advised of adverse effects of medication including but not limited to muscle soreness at site of injection and general malaise \par \par Counseling included abnormal lab results, differential diagnoses, treatment options, risks and benefits, lifestyle changes, current condition, medications, and dose adjustments. \par The patient was interactive, attentive, asked questions, and verbalized understanding

## 2020-10-15 NOTE — PHYSICAL EXAM
[No Acute Distress] : no acute distress [Well Developed] : well developed [Well-Appearing] : well-appearing [Well Nourished] : well nourished [PERRL] : pupils equal round and reactive to light [Normal Sclera/Conjunctiva] : normal sclera/conjunctiva [EOMI] : extraocular movements intact [Normal Outer Ear/Nose] : the outer ears and nose were normal in appearance [Normal Oropharynx] : the oropharynx was normal [No Lymphadenopathy] : no lymphadenopathy [No JVD] : no jugular venous distention [Thyroid Normal, No Nodules] : the thyroid was normal and there were no nodules present [Supple] : supple [No Respiratory Distress] : no respiratory distress  [No Accessory Muscle Use] : no accessory muscle use [Clear to Auscultation] : lungs were clear to auscultation bilaterally [Regular Rhythm] : with a regular rhythm [Normal Rate] : normal rate  [Normal S1, S2] : normal S1 and S2 [No Murmur] : no murmur heard [No Carotid Bruits] : no carotid bruits [No Varicosities] : no varicosities [No Abdominal Bruit] : a ~M bruit was not heard ~T in the abdomen [Pedal Pulses Present] : the pedal pulses are present [No Edema] : there was no peripheral edema [No Palpable Aorta] : no palpable aorta [No Extremity Clubbing/Cyanosis] : no extremity clubbing/cyanosis [Soft] : abdomen soft [Non Tender] : non-tender [No HSM] : no HSM [Non-distended] : non-distended [No Masses] : no abdominal mass palpated [Normal Bowel Sounds] : normal bowel sounds [Normal Posterior Cervical Nodes] : no posterior cervical lymphadenopathy [No Spinal Tenderness] : no spinal tenderness [Normal Anterior Cervical Nodes] : no anterior cervical lymphadenopathy [No CVA Tenderness] : no CVA  tenderness [No Joint Swelling] : no joint swelling [Grossly Normal Strength/Tone] : grossly normal strength/tone [Coordination Grossly Intact] : coordination grossly intact [No Rash] : no rash [No Focal Deficits] : no focal deficits [Normal Gait] : normal gait [Normal Affect] : the affect was normal [Normal Insight/Judgement] : insight and judgment were intact

## 2020-10-15 NOTE — HISTORY OF PRESENT ILLNESS
[FreeTextEntry1] : bp check/ vaccine [de-identified] : Ms. ELIZABETH GAGNON is a 75 year female with a PMH of asthma and hld comes to the office for follow up of chronic medical conditions and dose 2 of 2 SHingrix vaccine. Patient denies fever, cough SOB. No other complaints at this time.

## 2020-10-17 ENCOUNTER — NON-APPOINTMENT (OUTPATIENT)
Age: 75
End: 2020-10-17

## 2020-10-17 NOTE — ASSESSMENT
[FreeTextEntry1] : Cardiac Studies:\par \par TTE (8/2020):\par LVEF 60-65%, normal LV diastolic function\par No obvious LV regional wall motion abnormalities\par Normal right ventricle size and systolic function\par Normal atria sizes\par Mild aortic root calcification, no aortic stenosis\par Mild MR\par No pericardial effusion\par \par Carotid Doppler (8/2020):\par No hemodynamically significant stenosis in left or right ICA\par \par Assessment:\par Ms. Chi is a 75 year old overweight woman with past medical history of Hyperlipidemia and Vasovagal syncope who presents for follow-up evaluation of hypertension. She reports that her BP at home has been in 150s/90s with the wrist blood pressure monitor, so recommended patient to try and check the brachial cuff BP monitor instead. She is active and denies angina or dyspnea. Denies any recent vasovagal syncope episodes. Physical exam unremarkable. \par \par Recommendations:\par [] Hypertension: BP at home has been elevated, but patient will try brachial artery cuff machine instead and call office with results. Goal BP < 130/80. Continue Amlodipine 5 mg daily at this time. \par [] Hyperlipidemia: 10 yr ASCVD risk score of 33%, continue Atorvastatin 10 mg daily.\par [] Return to office: January 2021, or sooner if needed

## 2020-10-17 NOTE — PHYSICAL EXAM
[Normal Conjunctiva] : the conjunctiva exhibited no abnormalities [Heart Sounds] : normal S1 and S2 [Abnormal Walk] : normal gait [Oriented To Time, Place, And Person] : oriented to person, place, and time [FreeTextEntry1] : no lower extremity edema b/l

## 2020-10-17 NOTE — HISTORY OF PRESENT ILLNESS
[FreeTextEntry1] : Ms. Chi is a 75 year old overweight woman with past medical history of Hypertension, Hyperlipidemia and Vasovagal syncope who presents for follow-up of hypertension.\par \par The patient states that she has been checking her BP at home regularly with a wrist BP machine and it has been 150s/90s. She has no symptoms of headache or dizziness. She reports compliance with her medications. Continues to report feeling well, no angina or dyspnea. Also reports that she is aware of her triggers of syncope such and tries to avoid it.

## 2020-10-17 NOTE — REVIEW OF SYSTEMS
[Fever] : no fever [Blurry Vision] : no blurred vision [Chills] : no chills [Earache] : no earache [Palpitations] : no palpitations [Shortness Of Breath] : no shortness of breath [Chest Pain] : no chest pain [Joint Pain] : no joint pain [Cough] : no cough [Abdominal Pain] : no abdominal pain [Skin: A Rash] : no rash: [Dizziness] : no dizziness [Confusion] : no confusion was observed [Excessive Thirst] : no polydipsia [Easy Bruising] : no tendency for easy bruising

## 2020-10-21 ENCOUNTER — APPOINTMENT (OUTPATIENT)
Dept: DERMATOLOGY | Facility: CLINIC | Age: 75
End: 2020-10-21
Payer: MEDICARE

## 2020-10-21 PROCEDURE — 99213 OFFICE O/P EST LOW 20 MIN: CPT

## 2020-11-17 ENCOUNTER — APPOINTMENT (OUTPATIENT)
Dept: INTERNAL MEDICINE | Facility: CLINIC | Age: 75
End: 2020-11-17
Payer: MEDICARE

## 2020-11-17 DIAGNOSIS — J45.909 UNSPECIFIED ASTHMA, UNCOMPLICATED: ICD-10-CM

## 2020-11-17 PROCEDURE — 99213 OFFICE O/P EST LOW 20 MIN: CPT | Mod: 95

## 2020-11-17 NOTE — PLAN
[FreeTextEntry1] : Patient was educated on proper hand washing technique and encouraged social distancing in order to reduce the risk of the spread of COVID 19. Patient advised to seek medical attention if they develop FEVER or SOB. \par \par medication refilled for Asthma.\par \par Patient advised to go for COVID testing or seek medical attention in 2 days if her asthma symptoms do not improve with medication. \par \par Counseling included abnormal lab results, differential diagnoses, treatment options, risks and benefits, lifestyle changes, current condition, medications, and dose adjustments. \par The patient was interactive, attentive, asked questions, and verbalized understanding

## 2020-11-17 NOTE — HISTORY OF PRESENT ILLNESS
[Home] : at home, [unfilled] , at the time of the visit. [Medical Office: ([facility])___] : at the medical office located in  [Verbal consent obtained from patient] : the patient, [unfilled] [FreeTextEntry8] : Ms. ELIZABETH GAGNON is a 75 year female with a PMH of Asthma, HTN HLD calls the office for refill of medication for asthma. Patient denies fever, cough SOB. No other complaints at this time. \par \par Patient was educated on proper hand washing technique and encouraged social distancing in order to reduce the risk of the spread of COVID 19. Patient advised to seek medical attention if they develop FEVER or SOB. \par \par medication refilled for Asthma.\par \par Counseling included abnormal lab results, differential diagnoses, treatment options, risks and benefits, lifestyle changes, current condition, medications, and dose adjustments. \par The patient was interactive, attentive, asked questions, and verbalized understanding

## 2020-11-17 NOTE — PHYSICAL EXAM
[No Acute Distress] : no acute distress [Well Nourished] : well nourished [Well Developed] : well developed [Well-Appearing] : well-appearing [No Respiratory Distress] : no respiratory distress  [No Rash] : no rash [Coordination Grossly Intact] : coordination grossly intact [Normal Affect] : the affect was normal [Normal Insight/Judgement] : insight and judgment were intact

## 2020-12-19 ENCOUNTER — RX RENEWAL (OUTPATIENT)
Age: 75
End: 2020-12-19

## 2021-01-07 ENCOUNTER — APPOINTMENT (OUTPATIENT)
Dept: CARDIOLOGY | Facility: CLINIC | Age: 76
End: 2021-01-07
Payer: MEDICARE

## 2021-01-07 ENCOUNTER — NON-APPOINTMENT (OUTPATIENT)
Age: 76
End: 2021-01-07

## 2021-01-07 VITALS
TEMPERATURE: 97.3 F | WEIGHT: 178 LBS | RESPIRATION RATE: 15 BRPM | DIASTOLIC BLOOD PRESSURE: 80 MMHG | BODY MASS INDEX: 29.66 KG/M2 | HEART RATE: 78 BPM | HEIGHT: 65 IN | SYSTOLIC BLOOD PRESSURE: 138 MMHG

## 2021-01-07 PROCEDURE — 93000 ELECTROCARDIOGRAM COMPLETE: CPT

## 2021-01-07 PROCEDURE — 99214 OFFICE O/P EST MOD 30 MIN: CPT

## 2021-01-07 NOTE — ASSESSMENT
[FreeTextEntry1] : Cardiac Studies:\par \par TTE (8/2020):\par LVEF 60-65%, normal LV diastolic function\par No obvious LV regional wall motion abnormalities\par Normal right ventricle size and systolic function\par Normal atria sizes\par Mild aortic root calcification, no aortic stenosis\par Mild MR\par No pericardial effusion\par \par Carotid Doppler (8/2020):\par No hemodynamically significant stenosis in left or right ICA\par \par Assessment:\par Natalie Chi is a 75 year old overweight woman with past medical history of Asthma, Hypertension, Hyperlipidemia and Vasovagal syncope who presents for follow-up evaluation of hypertension. She reports that she has not been checking her BP at home and has not purchased the brachial cuff BP monitor yet, but plans to. Denies any angina on exertion. ECG with nonspecific ST/T wave abnormalities. Physical exam with borderline elevated BP.\par \par Recommendations:\par [] Hypertension: Recommended patient to purchase brachial artery cuff machine and to monitor BP daily, goal BP < 130/80. Continue Amlodipine 5 mg daily at this time. If BP higher than this, patient will call office and medication adjustment can be made over the phone. \par [] Hyperlipidemia: 10 yr ASCVD risk score of 33%, continue Atorvastatin 10 mg daily.\par [] Return to office: 6 months or sooner if needed

## 2021-01-07 NOTE — HISTORY OF PRESENT ILLNESS
[FreeTextEntry1] : Natalie Chi is a 75 year old overweight woman with past medical history of Asthma, Hypertension, Hyperlipidemia and Vasovagal syncope who presents for follow-up of hypertension.\par \par The patient reports that she had recent asthma attack which was relieved by an inhaler. She had chest tightness and dyspnea at that time. Otherwise, reports feeling better now. Denies angina on exertion. Reports that she has not been checking her BP regularly.

## 2021-01-14 ENCOUNTER — APPOINTMENT (OUTPATIENT)
Dept: INTERNAL MEDICINE | Facility: CLINIC | Age: 76
End: 2021-01-14
Payer: MEDICARE

## 2021-01-14 VITALS
WEIGHT: 178 LBS | SYSTOLIC BLOOD PRESSURE: 124 MMHG | BODY MASS INDEX: 29.66 KG/M2 | TEMPERATURE: 97.3 F | DIASTOLIC BLOOD PRESSURE: 72 MMHG | HEIGHT: 65 IN

## 2021-01-14 PROCEDURE — 99214 OFFICE O/P EST MOD 30 MIN: CPT | Mod: 25

## 2021-01-14 PROCEDURE — 36415 COLL VENOUS BLD VENIPUNCTURE: CPT

## 2021-01-14 NOTE — REVIEW OF SYSTEMS
[Negative] : Heme/Lymph Clindamycin Pregnancy And Lactation Text: This medication can be used in pregnancy if certain situations. Clindamycin is also present in breast milk.

## 2021-01-14 NOTE — HISTORY OF PRESENT ILLNESS
[FreeTextEntry1] : check up [de-identified] : Ms. ELIZABETH GAGNON is a 75 year female with a PMH of asthma and hld comes to the office for follow up of chronic medical conditions. Patient denies fever, cough SOB. No other complaints at this time.

## 2021-01-14 NOTE — PLAN
[FreeTextEntry1] : patient's BP well controlled without medication. will continue to monitor. \par \par Regarding patient's obesity\par - encourage lifestyle modifications\par - diet and exercise\par - reduce calorie intake\par - no soda/ junk food/ snacks\par - consider mixed grains/ whole grains, leafy vegetables, and an appropriate serving of protein \par \par Counseling included abnormal lab results, differential diagnoses, treatment options, risks and benefits, lifestyle changes, current condition, medications, and dose adjustments. \par The patient was interactive, attentive, asked questions, and verbalized understanding

## 2021-01-18 LAB
25(OH)D3 SERPL-MCNC: 53.3 NG/ML
ALBUMIN SERPL ELPH-MCNC: 4.4 G/DL
ALP BLD-CCNC: 87 U/L
ALT SERPL-CCNC: 32 U/L
ANION GAP SERPL CALC-SCNC: 13 MMOL/L
AST SERPL-CCNC: 32 U/L
BASOPHILS # BLD AUTO: 0.04 K/UL
BASOPHILS NFR BLD AUTO: 0.6 %
BILIRUB SERPL-MCNC: 0.5 MG/DL
BUN SERPL-MCNC: 21 MG/DL
CALCIUM SERPL-MCNC: 9.3 MG/DL
CHLORIDE SERPL-SCNC: 105 MMOL/L
CHOLEST SERPL-MCNC: 176 MG/DL
CO2 SERPL-SCNC: 22 MMOL/L
CREAT SERPL-MCNC: 1.02 MG/DL
EOSINOPHIL # BLD AUTO: 0.22 K/UL
EOSINOPHIL NFR BLD AUTO: 3.3 %
ESTIMATED AVERAGE GLUCOSE: 108 MG/DL
GLUCOSE SERPL-MCNC: 87 MG/DL
HBA1C MFR BLD HPLC: 5.4 %
HCT VFR BLD CALC: 42.7 %
HDLC SERPL-MCNC: 71 MG/DL
HGB BLD-MCNC: 13.5 G/DL
IMM GRANULOCYTES NFR BLD AUTO: 0.2 %
LDLC SERPL CALC-MCNC: 89 MG/DL
LYMPHOCYTES # BLD AUTO: 1.69 K/UL
LYMPHOCYTES NFR BLD AUTO: 25.4 %
MAN DIFF?: NORMAL
MCHC RBC-ENTMCNC: 31.5 PG
MCHC RBC-ENTMCNC: 31.6 GM/DL
MCV RBC AUTO: 99.5 FL
MONOCYTES # BLD AUTO: 0.38 K/UL
MONOCYTES NFR BLD AUTO: 5.7 %
NEUTROPHILS # BLD AUTO: 4.31 K/UL
NEUTROPHILS NFR BLD AUTO: 64.8 %
NONHDLC SERPL-MCNC: 105 MG/DL
PLATELET # BLD AUTO: 246 K/UL
POTASSIUM SERPL-SCNC: 4.4 MMOL/L
PROT SERPL-MCNC: 6.7 G/DL
RBC # BLD: 4.29 M/UL
RBC # FLD: 13.6 %
SODIUM SERPL-SCNC: 140 MMOL/L
TRIGL SERPL-MCNC: 81 MG/DL
TSH SERPL-ACNC: 1.46 UIU/ML
WBC # FLD AUTO: 6.65 K/UL

## 2021-03-08 ENCOUNTER — RX RENEWAL (OUTPATIENT)
Age: 76
End: 2021-03-08

## 2021-05-03 ENCOUNTER — RX CHANGE (OUTPATIENT)
Age: 76
End: 2021-05-03

## 2021-05-03 RX ORDER — BUDESONIDE AND FORMOTEROL FUMARATE DIHYDRATE 160; 4.5 UG/1; UG/1
160-4.5 AEROSOL RESPIRATORY (INHALATION) TWICE DAILY
Qty: 10.2 | Refills: 3 | Status: DISCONTINUED | COMMUNITY
Start: 2021-05-03 | End: 2021-05-03

## 2021-05-24 ENCOUNTER — APPOINTMENT (OUTPATIENT)
Dept: CARDIOLOGY | Facility: CLINIC | Age: 76
End: 2021-05-24
Payer: MEDICARE

## 2021-05-24 ENCOUNTER — NON-APPOINTMENT (OUTPATIENT)
Age: 76
End: 2021-05-24

## 2021-05-24 VITALS
DIASTOLIC BLOOD PRESSURE: 81 MMHG | HEART RATE: 75 BPM | TEMPERATURE: 98.1 F | WEIGHT: 171 LBS | BODY MASS INDEX: 28.49 KG/M2 | RESPIRATION RATE: 16 BRPM | SYSTOLIC BLOOD PRESSURE: 163 MMHG | HEIGHT: 65 IN | OXYGEN SATURATION: 96 %

## 2021-05-24 PROCEDURE — 99212 OFFICE O/P EST SF 10 MIN: CPT

## 2021-05-24 PROCEDURE — 93000 ELECTROCARDIOGRAM COMPLETE: CPT

## 2021-05-24 NOTE — HISTORY OF PRESENT ILLNESS
[FreeTextEntry1] : Natalie Chi is a 75 year old overweight woman with past medical history of Asthma, Hypertension, Hyperlipidemia and Vasovagal syncope who presents for follow-up visit.\par \par The patient reports that she has been doing well. Has log of her home BP readings with average SBP in mid to upper 130s. Reports that she has changed some of her diet and eating meal replacements, also reports not eating snacks such as ice cream and also substituting her wine at night with seltzer drinks.  Admits to not exercising or being motivated to exercise. Does report intentionally losing ~ 6 lbs since January visit here. She states that this past Saturday she was at a /mass service for her friend who passed away from COVID and she felt stressed the entire day. She was at a seated position for hours and then when she got up she had left arm discomfort, described as "muscle pain". It lasted for about 1 hour. Denies any substernal chest discomfort. Denies dyspnea. She does feel that she was stressed that day and was overwhelmed. She also reports being vaccinated for COVID-19.\par \par

## 2021-05-24 NOTE — PHYSICAL EXAM
[Normal] : alert and oriented, normal memory [de-identified] : elderly woman, overweight, no acute distress [de-identified] : JVP ~ 7 cm H20, RRR, s1, s2, no murmurs [de-identified] : unlabored respirations, clear to auscultation b/l  [de-identified] : overweight  [de-identified] : normal range of motion  [de-identified] : no lower extremity edema  [de-identified] : well perfused

## 2021-05-24 NOTE — REVIEW OF SYSTEMS
[Fever] : no fever [Chills] : no chills [Blurry Vision] : no blurred vision [Earache] : no earache [SOB] : no shortness of breath [Cough] : no cough [Abdominal Pain] : no abdominal pain [Muscle Cramps] : muscle cramps [Rash] : no rash [Dizziness] : no dizziness [Confusion] : no confusion was observed [Easy Bruising] : no tendency for easy bruising

## 2021-05-24 NOTE — ASSESSMENT
[FreeTextEntry1] : Cardiac Studies:\par \par TTE (2020):\par LVEF 60-65%, normal LV diastolic function\par No obvious LV regional wall motion abnormalities\par Normal right ventricle size and systolic function\par Normal atria sizes\par Mild aortic root calcification, no aortic stenosis\par Mild MR\par No pericardial effusion\par \par Carotid Doppler (2020):\par No hemodynamically significant stenosis in left or right ICA\par \par Assessment:\par Natalie Chi is a 75 year old overweight woman with past medical history of Asthma, Hypertension, Hyperlipidemia and Vasovagal syncope who presents for follow-up visit. Initial BP elevated, but on repeat BP better controlled at 122/80. Physical exam unremarkable. ECG with sinus rhythm and left axis deviation. Patient reports losing ~ 6 lbs since January visit and changing diet as well, eating healthier options. She does report an episode of left arm discomfort in the setting of significant stress on the day of a  that she was attending of a friend and given that this occurred in the setting of stress and given that woman can present with atypical symptoms of heart disease and also due to her age and risk factors, especially her significantly elevated 10-yr ASCVD risk score, will perform further non-invasive ischemic testing as follows: \par \par Recommendations:\par [] Left arm discomfort: Due to setting in which this occurred of stressful event and due to risk factors/elevated 10 yr ASCVD risk score will check exercise stress echocardiogram. If patient unable to perform at adequate level, then can consider pharmacologic nuclear stress test. \par [] Hypertension: BP elevated, repeat /80. Review of home BP with SBP in upper 130s and occasionally in 140s. Given elevated 10 yr ASCVD risk score > 10%, goal BP < 130/80 in this patient, so would increase Amlodipine from 5 mg to 7.5 mg daily at this time, patient to continue to monitor BP daily. Recommended DASH diet plan and exercise regimen 30-45 minutes for 3-4 days per week. \par [] Hyperlipidemia: 10 yr ASCVD risk score of 33% on prior visit, today after repeat BP check (122/80), 10 yr ASCVD risk score is  21.5%. LDL 89. Will increase Atorvastatin to 20 mg daily. \par [] Return to office: 2021 
Statement Selected

## 2021-06-21 ENCOUNTER — APPOINTMENT (OUTPATIENT)
Dept: CARDIOLOGY | Facility: CLINIC | Age: 76
End: 2021-06-21
Payer: MEDICARE

## 2021-06-21 PROCEDURE — 93351 STRESS TTE COMPLETE: CPT

## 2021-06-21 RX ADMIN — PERFLUTREN MG/ML: 6.52 INJECTION, SUSPENSION INTRAVENOUS at 00:00

## 2021-06-25 RX ORDER — PERFLUTREN 6.52 MG/ML
6.52 INJECTION, SUSPENSION INTRAVENOUS
Qty: 4 | Refills: 0 | Status: COMPLETED | OUTPATIENT
Start: 2021-06-21

## 2021-06-28 ENCOUNTER — NON-APPOINTMENT (OUTPATIENT)
Age: 76
End: 2021-06-28

## 2021-06-28 ENCOUNTER — APPOINTMENT (OUTPATIENT)
Dept: CARDIOLOGY | Facility: CLINIC | Age: 76
End: 2021-06-28
Payer: MEDICARE

## 2021-06-28 VITALS
SYSTOLIC BLOOD PRESSURE: 118 MMHG | DIASTOLIC BLOOD PRESSURE: 76 MMHG | WEIGHT: 170 LBS | OXYGEN SATURATION: 95 % | RESPIRATION RATE: 16 BRPM | HEIGHT: 65 IN | BODY MASS INDEX: 28.32 KG/M2 | HEART RATE: 92 BPM

## 2021-06-28 DIAGNOSIS — M79.603 PAIN IN ARM, UNSPECIFIED: ICD-10-CM

## 2021-06-28 PROCEDURE — 99213 OFFICE O/P EST LOW 20 MIN: CPT

## 2021-06-28 PROCEDURE — 93000 ELECTROCARDIOGRAM COMPLETE: CPT

## 2021-06-28 RX ORDER — AMLODIPINE BESYLATE 2.5 MG/1
2.5 TABLET ORAL DAILY
Qty: 90 | Refills: 0 | Status: DISCONTINUED | COMMUNITY
Start: 2021-05-24 | End: 2021-06-28

## 2021-06-28 RX ORDER — AMLODIPINE BESYLATE 5 MG/1
5 TABLET ORAL DAILY
Qty: 90 | Refills: 0 | Status: DISCONTINUED | COMMUNITY
Start: 2020-08-26 | End: 2021-06-28

## 2021-06-28 NOTE — ASSESSMENT
[FreeTextEntry1] : Cardiac Studies:\par \par Exercise Stress Echo (2021):\par Average functional capacity \par Baseline echo: LVEF 60-65%, no LV regional wall motion abnormalities\par Stress echo: LVEF 70-75%, no stress-induced LV wall motion abnormalities\par Negative stress echo test for ischemia (92% [facility]PH[facility])\par \par TTE (2020):\par LVEF 60-65%, normal LV diastolic function\par No obvious LV regional wall motion abnormalities\par Normal right ventricle size and systolic function\par Normal atria sizes\par Mild aortic root calcification, no aortic stenosis\par Mild MR\par No pericardial effusion\par \par Carotid Doppler (2020):\par No hemodynamically significant stenosis in left or right ICA\par \par Assessment:\par Natalie Chi is a 76 year old overweight woman with past medical history of Asthma, Hypertension, Hyperlipidemia and Vasovagal syncope who presents for follow-up visit regarding hypertension. The patient reports some vasovagal near-syncope episodes in setting of her known triggers of excessive laughter and constipation, which she tries to control, denies any actual syncope. Otherwise reports an increase in exercise tolerance, ~ 4 METs, no angina or dyspnea on exertion. ECG with no acute ischemic ST/T wave abnormalities and patient denies any recurrent left arm discomfort. BP better controlled on current dose of amlodipine but patient did have ankle edema which has now resolved, will plan to switch to a different antihypertensive.  \par \par Recommendations:\par [] Left arm discomfort: Likely in setting of stressful event in  and may be musculoskeletal as symptoms have completely resolved. Patient with no angina. Recent exercise stress echocardiogram with no ischemic LV wall motion abnormalities at a diagnostic peak level of stress. Recommended patient to monitor symptoms and if she develops any new chest discomfort/dyspnea to call office and would then plan for further testing such as pharmacologic nuclear stress test. \par [] Hypertension: BP better controlled on higher dose of amlodipine but given side effect of ankle edema (although resolved) will discontinue amlodipine and instead start Lisinopril 10 mg daily. Patient to continue to monitor BP daily. Given elevated 10 yr ASCVD risk score > 10%, goal BP < 130/80 in this patient. Recommended continued DASH diet plan and exercise regimen 30-45 minutes for 3-4 days per week. Patient to call office in 1 week with BP recordings.\par [] Vasovagal near syncope: Patient to avoid triggers such as constipation (reports that she has higher fiber diet in morning which helps), also to avoid excessive laughter. She is aware of her triggers and has methods to avoid them. No syncope. Patient to continue to monitor symptoms. \par [] Hyperlipidemia: 10 yr ASCVD risk score of 33% on prior visit, today's visit it is 22.7%, now on higher dose Atorvastatin 20 mg daily and tolerating it well \par [] Return to office: 2021, review of BP in 1 week over phone \par \par

## 2021-06-28 NOTE — PHYSICAL EXAM
[Normal] : alert and oriented, normal memory [de-identified] : elderly woman, overweight, no acute distress [de-identified] : JVP ~ 7 cm H20, RRR, s1, s2, no murmurs [de-identified] : unlabored respirations, clear to auscultation b/l  [de-identified] : overweight  [de-identified] : normal range of motion  [de-identified] : no lower extremity edema  [de-identified] : well perfused

## 2021-06-28 NOTE — CARDIOLOGY SUMMARY
[de-identified] : 5/24/21: normal sinus rhythm, left axis deviation \par 6/28/21: normal sinus rhythm, left axis deviation

## 2021-06-28 NOTE — HISTORY OF PRESENT ILLNESS
[FreeTextEntry1] : Natalie Chi is a 76 year old overweight woman with past medical history of Asthma, Hypertension, Hyperlipidemia and Vasovagal syncope who presents for follow-up visit.\par \par The patient states that she has been taking higher dose of amlodipine 7.5 mg daily as prescribed but noticed some ankle edema for some time that soon resolved after elevating her legs. She has a log of home BP readings with average /70s. Does report that when she is constipated she can have vasovagal presyncope symptoms or when she is laughing excessively she can have vasovagal presyncope. Denies any recent syncope. Denies any recurrent left arm discomfort since her friend's . States that she has been more active, walking around 1/4 mile every day, denies exertional angina or dyspnea. Denies palpitations. Denies muscle aches.\par \par \par

## 2021-06-28 NOTE — REVIEW OF SYSTEMS
[Fever] : no fever [Chills] : no chills [Blurry Vision] : no blurred vision [Earache] : no earache [SOB] : no shortness of breath [Chest Discomfort] : no chest discomfort [Lower Ext Edema] : no extremity edema [Cough] : no cough [Abdominal Pain] : no abdominal pain [Joint Pain] : no joint pain [Rash] : no rash [Dizziness] : no dizziness [Confusion] : no confusion was observed [Easy Bruising] : no tendency for easy bruising

## 2021-09-14 ENCOUNTER — APPOINTMENT (OUTPATIENT)
Dept: CARDIOLOGY | Facility: CLINIC | Age: 76
End: 2021-09-14
Payer: MEDICARE

## 2021-09-14 VITALS
HEART RATE: 77 BPM | SYSTOLIC BLOOD PRESSURE: 137 MMHG | HEIGHT: 65 IN | WEIGHT: 170 LBS | RESPIRATION RATE: 16 BRPM | BODY MASS INDEX: 28.32 KG/M2 | DIASTOLIC BLOOD PRESSURE: 68 MMHG

## 2021-09-14 DIAGNOSIS — R55 SYNCOPE AND COLLAPSE: ICD-10-CM

## 2021-09-14 PROCEDURE — 93000 ELECTROCARDIOGRAM COMPLETE: CPT

## 2021-09-14 PROCEDURE — 99213 OFFICE O/P EST LOW 20 MIN: CPT

## 2021-09-14 RX ORDER — BUDESONIDE AND FORMOTEROL FUMARATE DIHYDRATE 160; 4.5 UG/1; UG/1
160-4.5 AEROSOL RESPIRATORY (INHALATION) TWICE DAILY
Qty: 30.6 | Refills: 2 | Status: DISCONTINUED | COMMUNITY
Start: 2021-05-03 | End: 2021-09-14

## 2021-09-18 ENCOUNTER — NON-APPOINTMENT (OUTPATIENT)
Age: 76
End: 2021-09-18

## 2021-09-18 PROBLEM — R55 VASOVAGAL NEAR SYNCOPE: Status: ACTIVE | Noted: 2021-06-28

## 2021-09-18 NOTE — CARDIOLOGY SUMMARY
[de-identified] : 5/24/21: normal sinus rhythm, left axis deviation \par 6/28/21: normal sinus rhythm, left axis deviation \par 9/14/21: normal sinus rhythm, PVCs

## 2021-09-18 NOTE — HISTORY OF PRESENT ILLNESS
[FreeTextEntry1] : Natalie Chi is a 76 year old overweight woman with past medical history of Asthma, Hypertension, Hyperlipidemia and Vasovagal syncope who presents for follow-up visit regarding hypertension.\par \par Since her last visit the patient reports having a lot of stress at home with her adopted daughter which she hopes will resolve soon. She has her home BP log with average SBP in 120-130s. She denies any angina or dyspnea. Denies palpitations. Denies leg edema. Reports occasional vasovagal-like symptoms when straining to have a bowel movement, but denies any recent syncope. Reports compliance with medication.

## 2021-09-18 NOTE — ASSESSMENT
[FreeTextEntry1] : Cardiac Studies:\par \par Exercise Stress Echo (6/2021):\par Average functional capacity \par Baseline echo: LVEF 60-65%, no LV regional wall motion abnormalities\par Stress echo: LVEF 70-75%, no stress-induced LV wall motion abnormalities\par Negative stress echo test for ischemia (92% MPHR)\par \par TTE (8/2020):\par LVEF 60-65%, normal LV diastolic function\par No obvious LV regional wall motion abnormalities\par Normal right ventricle size and systolic function\par Normal atria sizes\par Mild aortic root calcification, no aortic stenosis\par Mild MR\par No pericardial effusion\par \par Carotid Doppler (8/2020):\par No hemodynamically significant stenosis in left or right ICA\par \par Assessment:\par Natalie Chi is a 76 year old overweight woman with past medical history of Asthma, Hypertension, Hyperlipidemia and Vasovagal syncope who presents for follow-up visit regarding hypertension. The patient has her home BP log with average SBP in 120-130s. She reports medication compliance and denies exertional angina or dyspnea. Her exercise tolerance remains > 4 METs. ECG today consistent with sinus rhythm with PVCs. Physical exam unremarkable. She reports stress at home with her adopted daughter but she hoping that this will resolve soon. \par \par Recommendations:\par [] Hypertension: Home BP log present and appears she is at goal < 130/80 most days of the week. No further ankle edema since amlodipine was discontinued. Patient may monitor BP once or twice weekly now that it is better controlled. Continue Lisinopril 10 mg daily. Recommended continued DASH diet plan and exercise regimen 30-45 minutes for 3-4 days per week. \par [] Vasovagal near syncope: Patient to avoid triggers such as constipation (reports that she has higher fiber diet in morning which helps), also to avoid excessive laughter. She is aware of her triggers and has methods to avoid them. No syncope. Patient to continue to monitor symptoms. \par [] Hyperlipidemia: 10 yr ASCVD risk score of 29.4%, now on higher dose Atorvastatin 20 mg daily and tolerating it well, LDL has improved from 101 mg/dl to 89 mg/dl\par [] Return to office: 6 months or sooner if needed\par \par This provider will be in a different office location and patient requests follow up with this provider.

## 2021-09-18 NOTE — REVIEW OF SYSTEMS
[Fever] : no fever [Chills] : no chills [Blurry Vision] : no blurred vision [Earache] : no earache [SOB] : no shortness of breath [Chest Discomfort] : no chest discomfort [Lower Ext Edema] : no extremity edema [Cough] : no cough [Abdominal Pain] : no abdominal pain [Nausea] : no nausea [Joint Pain] : no joint pain [Rash] : no rash [Dizziness] : no dizziness [Confusion] : no confusion was observed [Easy Bruising] : no tendency for easy bruising

## 2021-09-18 NOTE — PHYSICAL EXAM
[Normal] : alert and oriented, normal memory [de-identified] : elderly woman, overweight, no acute distress [de-identified] : JVP ~ 7 cm H20, RRR, s1, s2, no murmurs [de-identified] : unlabored respirations, clear to auscultation b/l  [de-identified] : overweight  [de-identified] : normal range of motion  [de-identified] : no lower extremity edema  [de-identified] : well perfused

## 2021-10-13 ENCOUNTER — APPOINTMENT (OUTPATIENT)
Dept: INTERNAL MEDICINE | Facility: CLINIC | Age: 76
End: 2021-10-13
Payer: MEDICARE

## 2021-10-13 VITALS
SYSTOLIC BLOOD PRESSURE: 147 MMHG | DIASTOLIC BLOOD PRESSURE: 67 MMHG | BODY MASS INDEX: 28.32 KG/M2 | WEIGHT: 170 LBS | TEMPERATURE: 97 F | HEIGHT: 65 IN | HEART RATE: 81 BPM

## 2021-10-13 DIAGNOSIS — Z20.822 CONTACT WITH AND (SUSPECTED) EXPOSURE TO COVID-19: ICD-10-CM

## 2021-10-13 PROCEDURE — 90662 IIV NO PRSV INCREASED AG IM: CPT

## 2021-10-13 PROCEDURE — G0439: CPT

## 2021-10-13 PROCEDURE — 36415 COLL VENOUS BLD VENIPUNCTURE: CPT

## 2021-10-13 PROCEDURE — G0008: CPT

## 2021-10-13 NOTE — PLAN
[FreeTextEntry1] : In regards to patients Physical exam, routine blood work drawn, will review results with patient.\par \par Patient received flu vaccine today. Patient advised of adverse effects of medication including but not limited to muscle soreness at site of injection and general malaise \par \par patient's BP well controlled without medication. will continue to monitor. \par \par Regarding patient's obesity\par - encourage lifestyle modifications\par - diet and exercise\par - reduce calorie intake\par - no soda/ junk food/ snacks\par - consider mixed grains/ whole grains, leafy vegetables, and an appropriate serving of protein \par \par Counseling included abnormal lab results, differential diagnoses, treatment options, risks and benefits, lifestyle changes, current condition, medications, and dose adjustments. \par The patient was interactive, attentive, asked questions, and verbalized understanding

## 2021-10-13 NOTE — HISTORY OF PRESENT ILLNESS
[FreeTextEntry1] : physical exam.  [de-identified] : Ms. ELIZABETH GAGNON is a 75 year female with a PMH of asthma and hld comes to the office for follow up of chronic medical conditions and physical exam. Patient denies fever, cough SOB. No other complaints at this time.

## 2021-10-14 ENCOUNTER — NON-APPOINTMENT (OUTPATIENT)
Age: 76
End: 2021-10-14

## 2021-10-14 LAB
25(OH)D3 SERPL-MCNC: 61.7 NG/ML
ALBUMIN SERPL ELPH-MCNC: 4.3 G/DL
ALP BLD-CCNC: 83 U/L
ALT SERPL-CCNC: 17 U/L
ANION GAP SERPL CALC-SCNC: 10 MMOL/L
AST SERPL-CCNC: 23 U/L
BASOPHILS # BLD AUTO: 0.04 K/UL
BASOPHILS NFR BLD AUTO: 0.5 %
BILIRUB SERPL-MCNC: 0.4 MG/DL
BUN SERPL-MCNC: 22 MG/DL
CALCIUM SERPL-MCNC: 9.7 MG/DL
CHLORIDE SERPL-SCNC: 102 MMOL/L
CHOLEST SERPL-MCNC: 195 MG/DL
CO2 SERPL-SCNC: 26 MMOL/L
COVID-19 SPIKE DOMAIN ANTIBODY INTERPRETATION: POSITIVE
CREAT SERPL-MCNC: 0.89 MG/DL
EOSINOPHIL # BLD AUTO: 0.16 K/UL
EOSINOPHIL NFR BLD AUTO: 2.2 %
ESTIMATED AVERAGE GLUCOSE: 105 MG/DL
GLUCOSE SERPL-MCNC: 85 MG/DL
HBA1C MFR BLD HPLC: 5.3 %
HCT VFR BLD CALC: 42.2 %
HDLC SERPL-MCNC: 62 MG/DL
HGB BLD-MCNC: 13.6 G/DL
IMM GRANULOCYTES NFR BLD AUTO: 0.3 %
LDLC SERPL CALC-MCNC: 107 MG/DL
LYMPHOCYTES # BLD AUTO: 2.07 K/UL
LYMPHOCYTES NFR BLD AUTO: 28.1 %
MAN DIFF?: NORMAL
MCHC RBC-ENTMCNC: 31.9 PG
MCHC RBC-ENTMCNC: 32.2 GM/DL
MCV RBC AUTO: 98.8 FL
MONOCYTES # BLD AUTO: 0.42 K/UL
MONOCYTES NFR BLD AUTO: 5.7 %
NEUTROPHILS # BLD AUTO: 4.65 K/UL
NEUTROPHILS NFR BLD AUTO: 63.2 %
NONHDLC SERPL-MCNC: 133 MG/DL
PLATELET # BLD AUTO: 218 K/UL
POTASSIUM SERPL-SCNC: 4.4 MMOL/L
PROT SERPL-MCNC: 6.4 G/DL
RBC # BLD: 4.27 M/UL
RBC # FLD: 13.7 %
SARS-COV-2 AB SERPL IA-ACNC: >250 U/ML
SODIUM SERPL-SCNC: 138 MMOL/L
TRIGL SERPL-MCNC: 130 MG/DL
TSH SERPL-ACNC: 1.27 UIU/ML
WBC # FLD AUTO: 7.36 K/UL

## 2021-10-20 ENCOUNTER — APPOINTMENT (OUTPATIENT)
Dept: DERMATOLOGY | Facility: CLINIC | Age: 76
End: 2021-10-20
Payer: MEDICARE

## 2021-10-20 PROCEDURE — 99213 OFFICE O/P EST LOW 20 MIN: CPT

## 2021-11-08 ENCOUNTER — NON-APPOINTMENT (OUTPATIENT)
Age: 76
End: 2021-11-08

## 2021-11-10 ENCOUNTER — RX RENEWAL (OUTPATIENT)
Age: 76
End: 2021-11-10

## 2021-12-13 ENCOUNTER — NON-APPOINTMENT (OUTPATIENT)
Age: 76
End: 2021-12-13

## 2021-12-13 ENCOUNTER — APPOINTMENT (OUTPATIENT)
Dept: INTERNAL MEDICINE | Facility: CLINIC | Age: 76
End: 2021-12-13
Payer: MEDICARE

## 2021-12-13 VITALS — DIASTOLIC BLOOD PRESSURE: 82 MMHG | SYSTOLIC BLOOD PRESSURE: 140 MMHG | HEART RATE: 88 BPM

## 2021-12-13 DIAGNOSIS — H93.13 TINNITUS, BILATERAL: ICD-10-CM

## 2021-12-13 PROCEDURE — 99213 OFFICE O/P EST LOW 20 MIN: CPT | Mod: 25

## 2021-12-13 PROCEDURE — 93000 ELECTROCARDIOGRAM COMPLETE: CPT

## 2021-12-13 NOTE — HISTORY OF PRESENT ILLNESS
[FreeTextEntry1] : follow up chronic medical conditions.  [de-identified] : Ms. ELIZABETH GAGNON is a 76 year female with a PMH of asthma and hld comes to the office for follow up of chronic medical conditions and follow up from Urgent care patient test negative for covid on 12/08/21 at the urgent care, she was diagnosed with Gastroenteritis and was advised to take Pepto-Bismol. Patient was also found to have palpitations at time of urgent care visit, Patient denies fever, cough SOB. No other complaints at this time. Patient is asymptomatic at time of visit.

## 2021-12-13 NOTE — HEALTH RISK ASSESSMENT
[0] : 2) Feeling down, depressed, or hopeless: Not at all (0) [PHQ-2 Negative - No further assessment needed] : PHQ-2 Negative - No further assessment needed [SMP4Hignd] : 0

## 2021-12-13 NOTE — PLAN
[FreeTextEntry1] : abnormal EKG- patient was referred to cardiologist\par \par Tinnitus- patient was referred to ENT \par \par Prior to appointment and during encounter with patient extensive medical records were reviewed including but not limited to, Hospital records records, out patient records, laboratory data and microbiology data \par In addition extensive time was also spentin reviewing diagnostic studies.\par \par Total encounter total time 20 mins\par >50% of time spent counseling/coordinating care \par \par Counseling included abnormal lab results, differential diagnoses, treatment options, risks and benefits, lifestyle changes, current condition, medications, and dose adjustments. \par The patient was interactive, attentive, asked questions, and verbalized understanding

## 2021-12-15 ENCOUNTER — NON-APPOINTMENT (OUTPATIENT)
Age: 76
End: 2021-12-15

## 2021-12-15 ENCOUNTER — APPOINTMENT (OUTPATIENT)
Dept: CARDIOLOGY | Facility: CLINIC | Age: 76
End: 2021-12-15
Payer: MEDICARE

## 2021-12-15 VITALS
HEART RATE: 85 BPM | HEIGHT: 65 IN | RESPIRATION RATE: 16 BRPM | OXYGEN SATURATION: 98 % | BODY MASS INDEX: 28.82 KG/M2 | WEIGHT: 173 LBS | DIASTOLIC BLOOD PRESSURE: 90 MMHG | SYSTOLIC BLOOD PRESSURE: 140 MMHG

## 2021-12-15 DIAGNOSIS — Z92.89 PERSONAL HISTORY OF OTHER MEDICAL TREATMENT: ICD-10-CM

## 2021-12-15 DIAGNOSIS — Z92.29 PERSONAL HISTORY OF OTHER DRUG THERAPY: ICD-10-CM

## 2021-12-15 PROCEDURE — 93000 ELECTROCARDIOGRAM COMPLETE: CPT

## 2021-12-15 PROCEDURE — 99214 OFFICE O/P EST MOD 30 MIN: CPT

## 2021-12-15 NOTE — PHYSICAL EXAM
[Well Developed] : well developed [Well Nourished] : well nourished [No Acute Distress] : no acute distress [Normal Conjunctiva] : normal conjunctiva [Normal Venous Pressure] : normal venous pressure [No Carotid Bruit] : no carotid bruit [Normal S1, S2] : normal S1, S2 [No Murmur] : no murmur [No Rub] : no rub [No Gallop] : no gallop [Clear Lung Fields] : clear lung fields [Good Air Entry] : good air entry [No Respiratory Distress] : no respiratory distress  [Soft] : abdomen soft [Non Tender] : non-tender [No Masses/organomegaly] : no masses/organomegaly [Normal Bowel Sounds] : normal bowel sounds [Normal Gait] : normal gait [No Edema] : no edema [No Cyanosis] : no cyanosis [No Clubbing] : no clubbing [No Varicosities] : no varicosities [Moves all extremities] : moves all extremities [No Focal Deficits] : no focal deficits [Normal Speech] : normal speech [Alert and Oriented] : alert and oriented [Normal memory] : normal memory [S4] : S4

## 2021-12-15 NOTE — HISTORY OF PRESENT ILLNESS
[FreeTextEntry1] : Patient presents to the office today having recently been at an urgent care about a week ago where she presented with diarrhea.  She was evaluated there including COVID-19 testing which was negative.  While being evaluated she was noted to have an irregular heartbeat and was told to follow-up with her doctor.  She saw her primary couple of days ago and had an EKG which demonstrated PVCs and she was sent for further evaluation.  Symptomatically the patient is feeling reasonably well.  She is under a lot of stress having to do with her daughters, 1 of whom currently has COVID-19 and the other form she is trying to affect from her home.  With all of her stressors she gets some occasional palpitations but nothing that really bothers her very much.  Her diarrhea has improved.  She reports no other physical symptoms at this time.  Patient denies chest pain, shortness of breath, orthopnea, presyncope, syncope.

## 2021-12-15 NOTE — ASSESSMENT
[FreeTextEntry1] : EKG: Sinus rhythm with no significant ST or T wave changes.  PVCs noted.  Poor R wave progression.\par \par 76-year-old female with a past medical history of hypertension, dyslipidemia, obesity who presents to me for evaluation of PVCs noted on recent EKGs and irregular heartbeat.  Patient appears to be asymptomatic of the PVCs at this time.  Some occasional palpitation she is having are likely related to her stress and anxiety.  I will do additional work-up given the frequency of the PVCs.  Blood pressure control appears to be reasonable at this time but she should be checking it more at home.  Recent blood work showed some increase in her LDL but I will hold off on increasing her medication for now and reconsider in the future.

## 2021-12-15 NOTE — DISCUSSION/SUMMARY
[FreeTextEntry1] : 1.  Check echocardiogram and Holter monitor to evaluate her PVCs.\par 2.  Continue current cardiac meds in doses as noted above for hypertension and dyslipidemia.\par 3.  Monitor BP at home, keep a log and bring to f/u.\par 4.  Patient encouraged to find ways to reduce stress.\par 5.  Patient encouraged to work on diet to lose weight.\par 6.  Follow up here after testing, and will make further recommendations at that time.

## 2021-12-22 ENCOUNTER — APPOINTMENT (OUTPATIENT)
Dept: CARDIOLOGY | Facility: CLINIC | Age: 76
End: 2021-12-22
Payer: MEDICARE

## 2021-12-22 PROCEDURE — 93306 TTE W/DOPPLER COMPLETE: CPT

## 2022-01-05 ENCOUNTER — APPOINTMENT (OUTPATIENT)
Dept: CARDIOLOGY | Facility: CLINIC | Age: 77
End: 2022-01-05
Payer: MEDICARE

## 2022-01-05 VITALS
DIASTOLIC BLOOD PRESSURE: 75 MMHG | RESPIRATION RATE: 16 BRPM | WEIGHT: 176 LBS | BODY MASS INDEX: 29.32 KG/M2 | HEIGHT: 65 IN | SYSTOLIC BLOOD PRESSURE: 173 MMHG | HEART RATE: 82 BPM | OXYGEN SATURATION: 97 %

## 2022-01-05 PROCEDURE — 99214 OFFICE O/P EST MOD 30 MIN: CPT

## 2022-01-05 RX ORDER — CHLORHEXIDINE GLUCONATE 4 %
LIQUID (ML) TOPICAL
Refills: 0 | Status: ACTIVE | COMMUNITY

## 2022-01-05 RX ORDER — ALBUTEROL SULFATE 90 UG/1
108 (90 BASE) INHALANT RESPIRATORY (INHALATION)
Qty: 1 | Refills: 1 | Status: DISCONTINUED | COMMUNITY
Start: 2018-10-10 | End: 2022-01-05

## 2022-01-05 RX ORDER — BUDESONIDE AND FORMOTEROL FUMARATE DIHYDRATE 160; 4.5 UG/1; UG/1
160-4.5 AEROSOL RESPIRATORY (INHALATION) TWICE DAILY
Qty: 1 | Refills: 3 | Status: DISCONTINUED | COMMUNITY
Start: 2021-03-08 | End: 2022-01-05

## 2022-01-05 RX ORDER — LACTOBACILLUS RHAMNOSUS GG 10B CELL
CAPSULE ORAL
Refills: 0 | Status: ACTIVE | COMMUNITY

## 2022-01-05 RX ORDER — PRENATAL VIT 75/IRON/FOLIC/OM3 28-800-440
COMBINATION PACKAGE (EA) ORAL
Refills: 0 | Status: ACTIVE | COMMUNITY

## 2022-01-05 NOTE — HISTORY OF PRESENT ILLNESS
[FreeTextEntry1] : Patient presents back today feeling physically well.  She reports no significant palpitations recently or any other symptoms.  She continues to reasonably active and is able to do so with no real limitations.  No dizziness or lightheadedness.  Patient denies chest pain, shortness of breath, palpitations, orthopnea, presyncope, syncope.

## 2022-01-05 NOTE — PHYSICAL EXAM
[Well Developed] : well developed [Well Nourished] : well nourished [No Acute Distress] : no acute distress [Normal Conjunctiva] : normal conjunctiva [Normal Venous Pressure] : normal venous pressure [No Carotid Bruit] : no carotid bruit [Normal S1, S2] : normal S1, S2 [No Murmur] : no murmur [No Rub] : no rub [No Gallop] : no gallop [S4] : S4 [Clear Lung Fields] : clear lung fields [Good Air Entry] : good air entry [No Respiratory Distress] : no respiratory distress  [Soft] : abdomen soft [Non Tender] : non-tender [No Masses/organomegaly] : no masses/organomegaly [Normal Bowel Sounds] : normal bowel sounds [Normal Gait] : normal gait [No Edema] : no edema [No Cyanosis] : no cyanosis [No Clubbing] : no clubbing [No Varicosities] : no varicosities [Moves all extremities] : moves all extremities [No Focal Deficits] : no focal deficits [Normal Speech] : normal speech [Alert and Oriented] : alert and oriented [Normal memory] : normal memory

## 2022-01-05 NOTE — DISCUSSION/SUMMARY
[FreeTextEntry1] : 1.  No additional cardiac testing at this time.  \par 2.  Add metoprolol ER 50 mg daily given her high burden of PVCs.  Holter monitor prior to follow-up.\par 3.  Continue other current cardiac meds in doses as noted above for hypertension and dyslipidemia.\par 4.  Monitor BP at home, keep a log and bring to f/u.\par 5.  Patient encouraged to find ways to reduce stress.\par 6.  Patient encouraged to work on diet to lose weight.\par 7.  She will have blood work done.\par 8.  Follow up here in two months.

## 2022-01-05 NOTE — ASSESSMENT
[FreeTextEntry1] : Echocardiogram December 22, 2021 demonstrated left ventricle normal size and function with ejection fraction of 60 to 65%.  Mild to moderate mitral, trace aortic, mild tricuspid and trace pulmonic regurgitation noted.\par \par Holter monitor December 15, 2021 demonstrated presenting rhythm is sinus with an average heart rate of 78 bpm, maximum 108, minimum 53 bpm.  Frequent PVCs were noted with a total of 306/h with bigeminy, trigeminy and 4 multifocal couplets.  12 total PACs were noted.  Patient reported no symptoms during the Holter.\par \par 76-year-old female with a past medical history of hypertension, dyslipidemia, obesity who presented to me for evaluation of PVCs noted on recent EKGs and irregular heartbeat.  Patient continues to be asymptomatic.  Echocardiogram shows a normal EF and only some mild valve disease.  Her Holter monitor does show very frequent PVCs over 300/h.  These appear to be completely asymptomatic but given the high burden I will initiate beta-blocker therapy.  Likely this should be adequate to control this.  Her blood pressure is again elevated today and I have asked her to start checking it again at home and we will see how it is after she starts the metoprolol.  I will also have her do repeat blood work given the high PVC burden.

## 2022-01-11 ENCOUNTER — NON-APPOINTMENT (OUTPATIENT)
Age: 77
End: 2022-01-11

## 2022-01-11 LAB
ANION GAP SERPL CALC-SCNC: 10 MMOL/L
BUN SERPL-MCNC: 17 MG/DL
CALCIUM SERPL-MCNC: 9.9 MG/DL
CHLORIDE SERPL-SCNC: 103 MMOL/L
CO2 SERPL-SCNC: 28 MMOL/L
CREAT SERPL-MCNC: 0.96 MG/DL
GLUCOSE SERPL-MCNC: 91 MG/DL
MAGNESIUM SERPL-MCNC: 1.9 MG/DL
POTASSIUM SERPL-SCNC: 4.8 MMOL/L
SODIUM SERPL-SCNC: 141 MMOL/L
TSH SERPL-ACNC: 1.57 UIU/ML

## 2022-01-17 ENCOUNTER — APPOINTMENT (OUTPATIENT)
Dept: GASTROENTEROLOGY | Facility: CLINIC | Age: 77
End: 2022-01-17
Payer: MEDICARE

## 2022-01-17 VITALS
BODY MASS INDEX: 28.32 KG/M2 | WEIGHT: 170 LBS | TEMPERATURE: 97.8 F | RESPIRATION RATE: 15 BRPM | SYSTOLIC BLOOD PRESSURE: 118 MMHG | OXYGEN SATURATION: 98 % | HEIGHT: 65 IN | HEART RATE: 84 BPM | DIASTOLIC BLOOD PRESSURE: 76 MMHG

## 2022-01-17 PROCEDURE — 99204 OFFICE O/P NEW MOD 45 MIN: CPT

## 2022-01-17 NOTE — PHYSICAL EXAM
[General Appearance - Alert] : alert [General Appearance - In No Acute Distress] : in no acute distress [General Appearance - Well Nourished] : well nourished [General Appearance - Well Developed] : well developed [Sclera] : the sclera and conjunctiva were normal [] : no respiratory distress [Respiration, Rhythm And Depth] : normal respiratory rhythm and effort [Exaggerated Use Of Accessory Muscles For Inspiration] : no accessory muscle use [Auscultation Breath Sounds / Voice Sounds] : lungs were clear to auscultation bilaterally [Apical Impulse] : the apical impulse was normal [Heart Rate And Rhythm] : heart rate was normal and rhythm regular [Heart Sounds] : normal S1 and S2 [Bowel Sounds] : normal bowel sounds [Abdomen Soft] : soft [Abdomen Tenderness] : non-tender [Normal Sphincter Tone] : normal sphincter tone [No Rectal Mass] : no rectal mass [Internal Hemorrhoid] : no internal hemorrhoids [External Hemorrhoid] : no external hemorrhoids [Occult Blood Positive] : stool was negative for occult blood [Femoral Lymph Nodes Enlarged Bilaterally] : femoral [Skin Color & Pigmentation] : normal skin color and pigmentation [Oriented To Time, Place, And Person] : oriented to person, place, and time [Impaired Insight] : insight and judgment were intact [Affect] : the affect was normal

## 2022-01-17 NOTE — ASSESSMENT
[FreeTextEntry1] : A/P\par Patient here for change in bowel habits.  Before December she had 1 regular bowel movement a day.  Had 1 episode of gastroenteritis in mid December and then symptoms turned to constipation.\par History of colon polyps, diverticulosis\par - try benebiber qd\par \par  I discussed the risks and benefits of colonoscopy and patient was given opportunity to ask questions. Colonoscopy to r/o colon cancer, polyps, AVM's. Patient is medically optimized for the procedure\par movi prep

## 2022-01-17 NOTE — HISTORY OF PRESENT ILLNESS
[de-identified] :  Patient with asthma , HTN, high chol , hx of colon polyps 2012.\par      I mid December, patient had gastroenteritis.  Her symptoms were lower abdominal cramping 10 loose bowel movements a day.  She had no rectal bleeding no fever.  No sick contacts no recent antibiotics or travel.  Patient took Pepto-Bismol and symptoms resolved in 48 hours.  Now however the patient has constipation..  She has hard bowel movement 3 times a week.  She eats a high-fiber diet with fiber cereal and fruits vegetables.  She has no family history of colon cancer or colon polyps.  Patient had a personal history of colon polyps in 2012 as well as diverticulosis and hemorrhoids.  I reviewed her previous colonoscopy report.  October 2021 CBC CMP are normal

## 2022-03-14 ENCOUNTER — APPOINTMENT (OUTPATIENT)
Dept: CARDIOLOGY | Facility: CLINIC | Age: 77
End: 2022-03-14

## 2022-03-16 ENCOUNTER — APPOINTMENT (OUTPATIENT)
Dept: CARDIOLOGY | Facility: CLINIC | Age: 77
End: 2022-03-16
Payer: MEDICARE

## 2022-03-16 PROCEDURE — ZZZZZ: CPT

## 2022-03-17 ENCOUNTER — LABORATORY RESULT (OUTPATIENT)
Age: 77
End: 2022-03-17

## 2022-03-18 ENCOUNTER — APPOINTMENT (OUTPATIENT)
Dept: CARDIOLOGY | Facility: CLINIC | Age: 77
End: 2022-03-18
Payer: MEDICARE

## 2022-03-18 ENCOUNTER — NON-APPOINTMENT (OUTPATIENT)
Age: 77
End: 2022-03-18

## 2022-03-18 VITALS
OXYGEN SATURATION: 96 % | SYSTOLIC BLOOD PRESSURE: 141 MMHG | WEIGHT: 175 LBS | HEIGHT: 65 IN | BODY MASS INDEX: 29.16 KG/M2 | HEART RATE: 59 BPM | DIASTOLIC BLOOD PRESSURE: 83 MMHG

## 2022-03-18 PROCEDURE — 99214 OFFICE O/P EST MOD 30 MIN: CPT

## 2022-03-18 PROCEDURE — 93000 ELECTROCARDIOGRAM COMPLETE: CPT | Mod: NC

## 2022-03-18 NOTE — DISCUSSION/SUMMARY
[FreeTextEntry1] : 1.  Proceed with colonoscopy as planned.\par 2.  Monitor through the procedure until stable post procedurally.\par 3.  No additional cardiac testing at this time.  Plan Holter monitor prior to follow-up.\par 4.  Increase metoprolol to 50 mg twice daily given still high PVC burden.\par 5.  Continue other current cardiac meds in doses as noted above for hypertension and dyslipidemia.\par 6.  Monitor BP at home, keep a log and bring to f/u.\par 7.  Patient encouraged to find ways to reduce stress.\par 8.  Patient encouraged to work on diet to lose weight.\par 9.  Follow up here in 3 months.

## 2022-03-18 NOTE — HISTORY OF PRESENT ILLNESS
[FreeTextEntry1] : Patient presents to the office today plan to undergo colonoscopy in the near future.  She had a bad bout of gastroenteritis and saw a gastroenterologist who ultimately recommended colonoscopy that has not been done in a number of years.  The symptoms with her bowels have since improved but she is planning a colonoscopy.  Symptomatically she reports being generally fatigued especially at the end of the day but no other specific physical symptoms.  She is able to do all of her normal activities without a problem.  She tolerated the addition of metoprolol without a problem.  She reports no dizziness or lightheadedness.  Patient denies chest pain, shortness of breath, palpitations, orthopnea, presyncope, syncope.

## 2022-03-18 NOTE — ASSESSMENT
[FreeTextEntry1] : Echocardiogram December 22, 2021 demonstrated left ventricle normal size and function with ejection fraction of 60 to 65%.  Mild to moderate mitral, trace aortic, mild tricuspid and trace pulmonic regurgitation noted.\par \par Holter monitor December 15, 2021 demonstrated presenting rhythm is sinus with an average heart rate of 78 bpm, maximum 108, minimum 53 bpm.  Frequent PVCs were noted with a total of 306/h with bigeminy, trigeminy and 4 multifocal couplets.  12 total PACs were noted.  Patient reported no symptoms during the Holter.\par \par Holter monitor March 16, 2022 demonstrated presenting rhythm was sinus with an average heart rate of 70 bpm, maximum 98, minimum 50 bpm.  PVCs totaling 343/h were noted with some bigeminy and some trigeminy.  Rare PACs.\par \par EKG: Sinus rhythm with no significant ST or T wave changes.  Ventricular bigeminy noted.\par \par 76-year-old female with a past medical history of hypertension, dyslipidemia, frequent PVCs, obesity who presents today for follow-up evaluation.  Repeat Holter monitor on metoprolol shows a similar burden of PVCs and she is in bigeminy here today.  I have recommended increasing her metoprolol to twice daily.  Her ejection fraction was normal on her recent echo and her blood work is unremarkable.  I do not believe any additional testing or treatment is needed.  Stress testing last year showed no evidence of ischemia.  She does above 4 METS of activity without a problem and there is no cardiac contraindication to colonoscopy.  Blood pressure is somewhat borderline here today but I would not make any additional changes for now.

## 2022-03-18 NOTE — PHYSICAL EXAM
[Well Developed] : well developed [Well Nourished] : well nourished [No Acute Distress] : no acute distress [Normal Conjunctiva] : normal conjunctiva [Normal Venous Pressure] : normal venous pressure [No Carotid Bruit] : no carotid bruit [Normal S1, S2] : normal S1, S2 [No Murmur] : no murmur [No Rub] : no rub [No Gallop] : no gallop [S4] : S4 [Clear Lung Fields] : clear lung fields [Good Air Entry] : good air entry [No Respiratory Distress] : no respiratory distress  [Soft] : abdomen soft [Non Tender] : non-tender [No Masses/organomegaly] : no masses/organomegaly [Normal Bowel Sounds] : normal bowel sounds [Normal Gait] : normal gait [No Edema] : no edema [No Cyanosis] : no cyanosis [No Clubbing] : no clubbing [Moves all extremities] : moves all extremities [No Varicosities] : no varicosities [No Focal Deficits] : no focal deficits [Normal Speech] : normal speech [Alert and Oriented] : alert and oriented [Normal memory] : normal memory [de-identified] : Regularly irregular rhythm

## 2022-03-20 ENCOUNTER — TRANSCRIPTION ENCOUNTER (OUTPATIENT)
Age: 77
End: 2022-03-20

## 2022-03-21 ENCOUNTER — OUTPATIENT (OUTPATIENT)
Dept: OUTPATIENT SERVICES | Facility: HOSPITAL | Age: 77
LOS: 1 days | End: 2022-03-21
Payer: MEDICARE

## 2022-03-21 ENCOUNTER — APPOINTMENT (OUTPATIENT)
Dept: GASTROENTEROLOGY | Facility: GI CENTER | Age: 77
End: 2022-03-21
Payer: MEDICARE

## 2022-03-21 DIAGNOSIS — R19.8 OTHER SPECIFIED SYMPTOMS AND SIGNS INVOLVING THE DIGESTIVE SYSTEM AND ABDOMEN: ICD-10-CM

## 2022-03-21 PROCEDURE — G0105: CPT

## 2022-03-21 PROCEDURE — 45378 DIAGNOSTIC COLONOSCOPY: CPT | Mod: PT

## 2022-03-21 NOTE — ASSESSMENT
[FreeTextEntry1] : A/P\par hx of colon polyps\par  I discussed the risks and benefits of colonoscopy and patient was given opportunity to ask questions. Colonoscopy to r/o colon cancer, polyps, AVM's. Patient is medically optimized for the procedure\par

## 2022-03-21 NOTE — PHYSICAL EXAM
[General Appearance - Alert] : alert [General Appearance - In No Acute Distress] : in no acute distress [General Appearance - Well Nourished] : well nourished [General Appearance - Well Developed] : well developed [Sclera] : the sclera and conjunctiva were normal [] : no respiratory distress [Exaggerated Use Of Accessory Muscles For Inspiration] : no accessory muscle use [Respiration, Rhythm And Depth] : normal respiratory rhythm and effort [Auscultation Breath Sounds / Voice Sounds] : lungs were clear to auscultation bilaterally [Apical Impulse] : the apical impulse was normal [Heart Rate And Rhythm] : heart rate was normal and rhythm regular [Heart Sounds] : normal S1 and S2 [Abdomen Soft] : soft [Bowel Sounds] : normal bowel sounds [Abdomen Tenderness] : non-tender [Oriented To Time, Place, And Person] : oriented to person, place, and time [Affect] : the affect was normal [Impaired Insight] : insight and judgment were intact

## 2022-03-28 ENCOUNTER — APPOINTMENT (OUTPATIENT)
Dept: DERMATOLOGY | Facility: CLINIC | Age: 77
End: 2022-03-28
Payer: MEDICARE

## 2022-03-28 ENCOUNTER — RESULT REVIEW (OUTPATIENT)
Age: 77
End: 2022-03-28

## 2022-03-28 PROCEDURE — 99212 OFFICE O/P EST SF 10 MIN: CPT | Mod: 25

## 2022-03-28 PROCEDURE — 11102 TANGNTL BX SKIN SINGLE LES: CPT

## 2022-04-11 ENCOUNTER — RX RENEWAL (OUTPATIENT)
Age: 77
End: 2022-04-11

## 2022-04-12 ENCOUNTER — APPOINTMENT (OUTPATIENT)
Dept: INTERNAL MEDICINE | Facility: CLINIC | Age: 77
End: 2022-04-12
Payer: MEDICARE

## 2022-04-12 VITALS
WEIGHT: 169.19 LBS | HEART RATE: 66 BPM | HEIGHT: 65 IN | BODY MASS INDEX: 28.19 KG/M2 | DIASTOLIC BLOOD PRESSURE: 87 MMHG | SYSTOLIC BLOOD PRESSURE: 144 MMHG

## 2022-04-12 PROCEDURE — 99214 OFFICE O/P EST MOD 30 MIN: CPT | Mod: 25

## 2022-04-12 PROCEDURE — 36415 COLL VENOUS BLD VENIPUNCTURE: CPT

## 2022-04-12 NOTE — PLAN
[FreeTextEntry1] : Chronic medical conditions- will test blood work and call patient with results\par \par Diarrhea- will test stool and call patient with results \par \par \par Prior to appointment and during encounter with patient extensive medical records were reviewed including but not limited to, Hospital records records, out patient records, laboratory data and microbiology data \par In addition extensive time was also spentin reviewing diagnostic studies.\par \par Total encounter total time 30 mins\par >50% of time spent counseling/coordinating care \par \par Counseling included abnormal lab results, differential diagnoses, treatment options, risks and benefits, lifestyle changes, current condition, medications, and dose adjustments. \par The patient was interactive, attentive, asked questions, and verbalized understanding

## 2022-04-12 NOTE — PHYSICAL EXAM
[No Acute Distress] : no acute distress [Well Nourished] : well nourished [Well Developed] : well developed [Well-Appearing] : well-appearing [Normal Sclera/Conjunctiva] : normal sclera/conjunctiva [PERRL] : pupils equal round and reactive to light [EOMI] : extraocular movements intact [Normal Outer Ear/Nose] : the outer ears and nose were normal in appearance [Normal Oropharynx] : the oropharynx was normal [No JVD] : no jugular venous distention [No Lymphadenopathy] : no lymphadenopathy [Supple] : supple [No Respiratory Distress] : no respiratory distress  [No Accessory Muscle Use] : no accessory muscle use [Clear to Auscultation] : lungs were clear to auscultation bilaterally [Normal Rate] : normal rate  [Regular Rhythm] : with a regular rhythm [Normal S1, S2] : normal S1 and S2 [No Carotid Bruits] : no carotid bruits [No Abdominal Bruit] : a ~M bruit was not heard ~T in the abdomen [No Varicosities] : no varicosities [No Edema] : there was no peripheral edema [No Palpable Aorta] : no palpable aorta [No Extremity Clubbing/Cyanosis] : no extremity clubbing/cyanosis [Soft] : abdomen soft [Non Tender] : non-tender [Non-distended] : non-distended [No HSM] : no HSM [Normal Bowel Sounds] : normal bowel sounds [Normal Posterior Cervical Nodes] : no posterior cervical lymphadenopathy [Normal Anterior Cervical Nodes] : no anterior cervical lymphadenopathy [No CVA Tenderness] : no CVA  tenderness [No Spinal Tenderness] : no spinal tenderness [No Joint Swelling] : no joint swelling [Grossly Normal Strength/Tone] : grossly normal strength/tone [No Rash] : no rash [Coordination Grossly Intact] : coordination grossly intact [No Focal Deficits] : no focal deficits [Normal Gait] : normal gait [Deep Tendon Reflexes (DTR)] : deep tendon reflexes were 2+ and symmetric [Normal Affect] : the affect was normal [Normal Insight/Judgement] : insight and judgment were intact

## 2022-04-12 NOTE — HISTORY OF PRESENT ILLNESS
[FreeTextEntry1] : diarrhea/abdominal pain [de-identified] : Ms. ELIZABETH GAGNON is a 76 year female with a PMH of asthma and hld comes to the office for follow up of chronic medical conditions and c/o diarrhea x 1 week. Denies blood in stool, nausea or vomiting.  Patient denies fever, cough SOB. No other complaints at this time.

## 2022-04-12 NOTE — HEALTH RISK ASSESSMENT
[0] : 2) Feeling down, depressed, or hopeless: Not at all (0) [PHQ-2 Negative - No further assessment needed] : PHQ-2 Negative - No further assessment needed [[id number]] : 0

## 2022-04-13 LAB
25(OH)D3 SERPL-MCNC: 45.3 NG/ML
ALBUMIN SERPL ELPH-MCNC: 4.6 G/DL
ALP BLD-CCNC: 84 U/L
ALT SERPL-CCNC: 18 U/L
ANION GAP SERPL CALC-SCNC: 15 MMOL/L
AST SERPL-CCNC: 25 U/L
BASOPHILS # BLD AUTO: 0.04 K/UL
BASOPHILS NFR BLD AUTO: 0.4 %
BILIRUB SERPL-MCNC: 0.6 MG/DL
BUN SERPL-MCNC: 24 MG/DL
CALCIUM SERPL-MCNC: 10.7 MG/DL
CHLORIDE SERPL-SCNC: 105 MMOL/L
CHOLEST SERPL-MCNC: 153 MG/DL
CO2 SERPL-SCNC: 21 MMOL/L
CREAT SERPL-MCNC: 1.01 MG/DL
EGFR: 57 ML/MIN/1.73M2
EOSINOPHIL # BLD AUTO: 0.36 K/UL
EOSINOPHIL NFR BLD AUTO: 3.8 %
ESTIMATED AVERAGE GLUCOSE: 114 MG/DL
GLUCOSE SERPL-MCNC: 79 MG/DL
HBA1C MFR BLD HPLC: 5.6 %
HCT VFR BLD CALC: 44.7 %
HDLC SERPL-MCNC: 59 MG/DL
HGB BLD-MCNC: 14.4 G/DL
IMM GRANULOCYTES NFR BLD AUTO: 0.3 %
LDLC SERPL CALC-MCNC: 77 MG/DL
LYMPHOCYTES # BLD AUTO: 2.1 K/UL
LYMPHOCYTES NFR BLD AUTO: 22.3 %
MAGNESIUM SERPL-MCNC: 1.7 MG/DL
MAN DIFF?: NORMAL
MCHC RBC-ENTMCNC: 31.7 PG
MCHC RBC-ENTMCNC: 32.2 GM/DL
MCV RBC AUTO: 98.5 FL
MONOCYTES # BLD AUTO: 0.49 K/UL
MONOCYTES NFR BLD AUTO: 5.2 %
NEUTROPHILS # BLD AUTO: 6.41 K/UL
NEUTROPHILS NFR BLD AUTO: 68 %
NONHDLC SERPL-MCNC: 95 MG/DL
PLATELET # BLD AUTO: 239 K/UL
POTASSIUM SERPL-SCNC: 4.9 MMOL/L
PROT SERPL-MCNC: 6.8 G/DL
RBC # BLD: 4.54 M/UL
RBC # FLD: 13.2 %
SODIUM SERPL-SCNC: 141 MMOL/L
TRIGL SERPL-MCNC: 88 MG/DL
TSH SERPL-ACNC: 2.08 UIU/ML
WBC # FLD AUTO: 9.43 K/UL

## 2022-04-15 LAB
C DIFF TOX GENS STL QL NAA+PROBE: NORMAL
CDIFF BY PCR: NOT DETECTED
GI PCR PANEL, STOOL: NORMAL

## 2022-05-05 ENCOUNTER — RX RENEWAL (OUTPATIENT)
Age: 77
End: 2022-05-05

## 2022-05-19 ENCOUNTER — APPOINTMENT (OUTPATIENT)
Dept: DERMATOLOGY | Facility: CLINIC | Age: 77
End: 2022-05-19

## 2022-05-23 ENCOUNTER — APPOINTMENT (OUTPATIENT)
Dept: FAMILY MEDICINE | Facility: CLINIC | Age: 77
End: 2022-05-23
Payer: MEDICARE

## 2022-05-23 VITALS
SYSTOLIC BLOOD PRESSURE: 165 MMHG | HEART RATE: 88 BPM | HEIGHT: 65 IN | DIASTOLIC BLOOD PRESSURE: 93 MMHG | WEIGHT: 169 LBS | BODY MASS INDEX: 28.16 KG/M2 | RESPIRATION RATE: 16 BRPM

## 2022-05-23 DIAGNOSIS — N39.0 URINARY TRACT INFECTION, SITE NOT SPECIFIED: ICD-10-CM

## 2022-05-23 LAB
BILIRUB UR QL STRIP: NORMAL
GLUCOSE UR-MCNC: NORMAL
HCG UR QL: 0.2 EU/DL
HGB UR QL STRIP.AUTO: NORMAL
KETONES UR-MCNC: 15
LEUKOCYTE ESTERASE UR QL STRIP: NORMAL
NITRITE UR QL STRIP: NORMAL
PH UR STRIP: 5.5
PROT UR STRIP-MCNC: 100
SP GR UR STRIP: 1030

## 2022-05-23 PROCEDURE — 99213 OFFICE O/P EST LOW 20 MIN: CPT | Mod: 25

## 2022-05-23 PROCEDURE — 81003 URINALYSIS AUTO W/O SCOPE: CPT | Mod: QW

## 2022-05-23 NOTE — PHYSICAL EXAM
[Soft] : abdomen soft [No Spinal Tenderness] : no spinal tenderness [Coordination Grossly Intact] : coordination grossly intact [No Focal Deficits] : no focal deficits [Normal Gait] : normal gait [Speech Grossly Normal] : speech grossly normal [Normal Mood] : the mood was normal [Normal] : affect was normal and insight and judgment were intact [de-identified] : generalized tenderness to deep plapation more prominent in the lower abdomen [de-identified] : mild lower back tenderness b/l

## 2022-05-23 NOTE — HISTORY OF PRESENT ILLNESS
[FreeTextEntry8] : Ms. ELIZABETH GAGNON is a pleasant 76 year old female with PMH of asthma, HLD, pre-DM, HTN, who normally follows up with Dr Klein. Patient comes to the office for polyuria, dribbling, lower abdominal pain, lower back pain which she reports is chronic due to history of hip replacement, nausea, she vomited 3 times today.  Denies dysuria, hematuria, fever. Has been taking benafiber for constipation per her GI after a recent colonoscopy, has felt bloated recently and has been decreasing the dose. She is tolerating the oral diet. Denies history of frequent UTIs

## 2022-05-23 NOTE — PLAN
[FreeTextEntry1] : \par Regarding UTI: \par POCT UA done, no nitrites and leuk but + for blood, prot\par Ciprofloxacin  500 mg Q 12 hrs (creat clear 56 ml/min) x 7 days\par I encouraged this patient to increase her oral hydration, also regarding hygiene measures, cotton underwear and to monitor for worsening symptoms including but not limited to hematuria, worsening pain, fever, nausea, vomiting and to go tot the Emergency Department if she has them \par \par BP elevated today, possibly due to her current symptoms\par \par Return to care: for her regular follow ups or earlier if needed\par Call or return for any questions

## 2022-05-25 ENCOUNTER — NON-APPOINTMENT (OUTPATIENT)
Age: 77
End: 2022-05-25

## 2022-05-25 LAB — BACTERIA UR CULT: NORMAL

## 2022-06-02 ENCOUNTER — APPOINTMENT (OUTPATIENT)
Dept: DERMATOLOGY | Facility: CLINIC | Age: 77
End: 2022-06-02
Payer: MEDICARE

## 2022-06-02 ENCOUNTER — NON-APPOINTMENT (OUTPATIENT)
Age: 77
End: 2022-06-02

## 2022-06-02 DIAGNOSIS — C44.311 BASAL CELL CARCINOMA OF SKIN OF NOSE: ICD-10-CM

## 2022-06-02 PROCEDURE — 14061 TIS TRNFR E/N/E/L10.1-30SQCM: CPT

## 2022-06-02 PROCEDURE — 17311 MOHS 1 STAGE H/N/HF/G: CPT

## 2022-06-09 ENCOUNTER — APPOINTMENT (OUTPATIENT)
Dept: INTERNAL MEDICINE | Facility: CLINIC | Age: 77
End: 2022-06-09
Payer: MEDICARE

## 2022-06-09 VITALS
WEIGHT: 169 LBS | SYSTOLIC BLOOD PRESSURE: 114 MMHG | HEIGHT: 65 IN | DIASTOLIC BLOOD PRESSURE: 82 MMHG | HEART RATE: 75 BPM | OXYGEN SATURATION: 99 % | BODY MASS INDEX: 28.16 KG/M2

## 2022-06-09 DIAGNOSIS — R05.9 COUGH, UNSPECIFIED: ICD-10-CM

## 2022-06-09 DIAGNOSIS — Z91.09 OTHER ALLERGY STATUS, OTHER THAN TO DRUGS AND BIOLOGICAL SUBSTANCES: ICD-10-CM

## 2022-06-09 PROCEDURE — 99214 OFFICE O/P EST MOD 30 MIN: CPT

## 2022-06-09 NOTE — PLAN
[FreeTextEntry1] : Patient with sinusitis, will start antibiotics and intranasal steroid today. \par \par Allergic rhinitis- Patient prescribed Intranasal steroid and Oral antihistamine. \par \par Prior to appointment and during encounter with patient extensive medical records were reviewed including but not limited to, Hospital records records, out patient records, laboratory data and microbiology data \par In addition extensive time was also spent in reviewing diagnostic studies.\par \par Total encounter total time 30 mins\par >50% of time spent counseling/coordinating care \par \par Counseling included abnormal lab results, differential diagnoses, treatment options, risks and benefits, lifestyle changes, current condition, medications, and dose adjustments. \par The patient was interactive, attentive, asked questions, and verbalized understanding

## 2022-06-09 NOTE — HISTORY OF PRESENT ILLNESS
[FreeTextEntry1] : follow up chronic medical conditions.  [de-identified] : Ms. ELIZABETH GAGNON is a 77 year female with a PMH of asthma and hld comes to the office for follow up of chronic medical conditions and c/o dry cough x 3 days and post nasal drip. Patient denies fever, cough SOB. No other complaints at this time.

## 2022-06-09 NOTE — PHYSICAL EXAM
[No Acute Distress] : no acute distress [Well Nourished] : well nourished [Well Developed] : well developed [Well-Appearing] : well-appearing [Normal Sclera/Conjunctiva] : normal sclera/conjunctiva [PERRL] : pupils equal round and reactive to light [EOMI] : extraocular movements intact [Normal Outer Ear/Nose] : the outer ears and nose were normal in appearance [No JVD] : no jugular venous distention [No Lymphadenopathy] : no lymphadenopathy [Supple] : supple [No Respiratory Distress] : no respiratory distress  [No Accessory Muscle Use] : no accessory muscle use [Clear to Auscultation] : lungs were clear to auscultation bilaterally [Normal Rate] : normal rate  [Regular Rhythm] : with a regular rhythm [Normal S1, S2] : normal S1 and S2 [No Carotid Bruits] : no carotid bruits [No Abdominal Bruit] : a ~M bruit was not heard ~T in the abdomen [No Varicosities] : no varicosities [No Edema] : there was no peripheral edema [No Palpable Aorta] : no palpable aorta [No Extremity Clubbing/Cyanosis] : no extremity clubbing/cyanosis [Soft] : abdomen soft [Non Tender] : non-tender [Non-distended] : non-distended [No HSM] : no HSM [Normal Bowel Sounds] : normal bowel sounds [Normal Posterior Cervical Nodes] : no posterior cervical lymphadenopathy [Normal Anterior Cervical Nodes] : no anterior cervical lymphadenopathy [No CVA Tenderness] : no CVA  tenderness [No Spinal Tenderness] : no spinal tenderness [No Joint Swelling] : no joint swelling [Grossly Normal Strength/Tone] : grossly normal strength/tone [No Rash] : no rash [Coordination Grossly Intact] : coordination grossly intact [No Focal Deficits] : no focal deficits [Normal Gait] : normal gait [Deep Tendon Reflexes (DTR)] : deep tendon reflexes were 2+ and symmetric [Normal Affect] : the affect was normal [Normal Insight/Judgement] : insight and judgment were intact [de-identified] : fullness of TM bilaterally, cobblestoning of pharynx tenderness with palpation of right maxillary sinus

## 2022-06-09 NOTE — HEALTH RISK ASSESSMENT
[0] : 2) Feeling down, depressed, or hopeless: Not at all (0) [PHQ-2 Negative - No further assessment needed] : PHQ-2 Negative - No further assessment needed [[id number]] : 0

## 2022-06-21 ENCOUNTER — APPOINTMENT (OUTPATIENT)
Dept: CARDIOLOGY | Facility: CLINIC | Age: 77
End: 2022-06-21
Payer: MEDICARE

## 2022-06-21 PROCEDURE — ZZZZZ: CPT

## 2022-06-28 ENCOUNTER — APPOINTMENT (OUTPATIENT)
Dept: CARDIOLOGY | Facility: CLINIC | Age: 77
End: 2022-06-28

## 2022-06-28 ENCOUNTER — NON-APPOINTMENT (OUTPATIENT)
Age: 77
End: 2022-06-28

## 2022-06-28 VITALS
DIASTOLIC BLOOD PRESSURE: 83 MMHG | BODY MASS INDEX: 28.16 KG/M2 | RESPIRATION RATE: 16 BRPM | WEIGHT: 169 LBS | SYSTOLIC BLOOD PRESSURE: 126 MMHG | OXYGEN SATURATION: 98 % | HEART RATE: 76 BPM | HEIGHT: 65 IN

## 2022-06-28 PROCEDURE — 99214 OFFICE O/P EST MOD 30 MIN: CPT

## 2022-06-28 PROCEDURE — 93000 ELECTROCARDIOGRAM COMPLETE: CPT

## 2022-06-28 RX ORDER — CIPROFLOXACIN HYDROCHLORIDE 500 MG/1
500 TABLET, FILM COATED ORAL DAILY
Qty: 7 | Refills: 0 | Status: DISCONTINUED | COMMUNITY
Start: 2022-05-23 | End: 2022-06-28

## 2022-06-28 RX ORDER — POLYETHYLENE GLYCOL 3350, SODIUM SULFATE, SODIUM CHLORIDE, POTASSIUM CHLORIDE, SODIUM ASCORBATE, AND ASCORBIC ACID 7.5-2.691G
100 KIT ORAL
Qty: 1 | Refills: 0 | Status: DISCONTINUED | COMMUNITY
Start: 2022-01-17 | End: 2022-06-28

## 2022-06-28 RX ORDER — BENZONATATE 200 MG/1
200 CAPSULE ORAL
Qty: 30 | Refills: 0 | Status: DISCONTINUED | COMMUNITY
Start: 2022-06-09 | End: 2022-06-28

## 2022-06-28 NOTE — ASSESSMENT
[FreeTextEntry1] : Echocardiogram December 22, 2021 demonstrated left ventricle normal size and function with ejection fraction of 60 to 65%.  Mild to moderate mitral, trace aortic, mild tricuspid and trace pulmonic regurgitation noted.\par \par Holter monitor December 15, 2021 demonstrated presenting rhythm is sinus with an average heart rate of 78 bpm, maximum 108, minimum 53 bpm.  Frequent PVCs were noted with a total of 306/h with bigeminy, trigeminy and 4 multifocal couplets.  12 total PACs were noted.  Patient reported no symptoms during the Holter.\par \par Holter monitor March 16, 2022 demonstrated presenting rhythm was sinus with an average heart rate of 70 bpm, maximum 98, minimum 50 bpm.  PVCs totaling 343/h were noted with some bigeminy and some trigeminy.  Rare PACs.\par \par Holter monitor June 21, 2022 presenting rhythm is sinus with an average heart rate of 61 bpm, maximum 89, minimum 47 bpm.  PVCs totaling 209/h were noted with some and trigeminy.  Total burden is just under 6%.  No other significant arrhythmia.\par \par EKG: Sinus rhythm with no significant ST or T wave changes.  Poor R wave progression.\par \par 77-year-old female with a past medical history of hypertension, dyslipidemia, frequent PVCs, obesity who presents today for follow-up evaluation.  Repeat Holter monitor shows a decrease in her PVC burden from around 8% to under 6%.  She tolerated the increase in metoprolol without a problem.  At this time her total PVC per hour burden is a little high but as her percentage is lower I do not believe any additional changes to her medication are needed.  She remains completely asymptomatic.  Blood pressure is controlled.  Recent blood work shows good control of her lipids.

## 2022-06-28 NOTE — HISTORY OF PRESENT ILLNESS
[FreeTextEntry1] : Patient returns to office today feeling well and offering no complaints.  She continues to be active and is able to do so with no physical limitations.  She is working and able to do all of her daily activities without a problem.  She tolerated the increase in metoprolol without an issue and reports no significant side effects.  No dizziness or lightheadedness.  Patient denies chest pain, shortness of breath, palpitations, orthopnea, presyncope, syncope.

## 2022-06-28 NOTE — DISCUSSION/SUMMARY
[FreeTextEntry1] : 1.  No additional cardiac testing at this time.  Plan 1 week Holter monitor prior to follow-up to better evaluate her PVC burden..\par 2.  Continue other current cardiac meds in doses as noted above for hypertension, PVCs and dyslipidemia.\par 3.  Monitor BP at home, keep a log and bring to f/u.\par 4.  Patient encouraged to find ways to reduce stress.\par 5.  Patient encouraged to work on diet to lose weight.\par 6.  Follow up here in 3 months. [EKG obtained to assist in diagnosis and management of assessed problem(s)] : EKG obtained to assist in diagnosis and management of assessed problem(s)

## 2022-07-21 ENCOUNTER — APPOINTMENT (OUTPATIENT)
Dept: DERMATOLOGY | Facility: CLINIC | Age: 77
End: 2022-07-21

## 2022-07-21 PROCEDURE — ZZZZZ: CPT

## 2022-08-01 ENCOUNTER — RX CHANGE (OUTPATIENT)
Age: 77
End: 2022-08-01

## 2022-08-01 DIAGNOSIS — J30.9 ALLERGIC RHINITIS, UNSPECIFIED: ICD-10-CM

## 2022-08-01 RX ORDER — FLUTICASONE PROPIONATE 50 UG/1
50 SPRAY, METERED NASAL TWICE DAILY
Qty: 32 | Refills: 1 | Status: DISCONTINUED | COMMUNITY
Start: 2022-06-09 | End: 2022-08-01

## 2022-09-07 ENCOUNTER — APPOINTMENT (OUTPATIENT)
Dept: CARDIOLOGY | Facility: CLINIC | Age: 77
End: 2022-09-07

## 2022-09-07 PROCEDURE — ZZZZZ: CPT

## 2022-09-12 ENCOUNTER — RX RENEWAL (OUTPATIENT)
Age: 77
End: 2022-09-12

## 2022-09-15 ENCOUNTER — RESULT REVIEW (OUTPATIENT)
Age: 77
End: 2022-09-15

## 2022-09-22 ENCOUNTER — APPOINTMENT (OUTPATIENT)
Dept: GASTROENTEROLOGY | Facility: CLINIC | Age: 77
End: 2022-09-22

## 2022-09-22 VITALS
TEMPERATURE: 98.2 F | RESPIRATION RATE: 14 BRPM | DIASTOLIC BLOOD PRESSURE: 78 MMHG | OXYGEN SATURATION: 98 % | HEIGHT: 65 IN | SYSTOLIC BLOOD PRESSURE: 118 MMHG | WEIGHT: 172 LBS | HEART RATE: 72 BPM | BODY MASS INDEX: 28.66 KG/M2

## 2022-09-22 DIAGNOSIS — R19.8 OTHER SPECIFIED SYMPTOMS AND SIGNS INVOLVING THE DIGESTIVE SYSTEM AND ABDOMEN: ICD-10-CM

## 2022-09-22 DIAGNOSIS — R14.3 FLATULENCE: ICD-10-CM

## 2022-09-22 PROCEDURE — 99214 OFFICE O/P EST MOD 30 MIN: CPT

## 2022-09-22 RX ORDER — DOXYCYCLINE HYCLATE 100 MG/1
100 TABLET ORAL TWICE DAILY
Qty: 14 | Refills: 0 | Status: DISCONTINUED | COMMUNITY
Start: 2022-06-09 | End: 2022-09-22

## 2022-09-22 NOTE — PHYSICAL EXAM

## 2022-09-22 NOTE — HISTORY OF PRESENT ILLNESS
[FreeTextEntry1] : Patient is a 77 year old female, with PMH HTN, HLD, who presents for follow up visit for worsening gas/bloating. Pt is belching and passing flatulence. She denies heartburn, acid reflux, abdominal pain. She gets intermittent loose stools from time to time. She had a colonoscopy in March 2022 which was normal except for hemorrhoids and diverticulosis. She does have a h/o colon polyps in 2012. \par \par Pt is using Benefiber daily. \par \par Patient denies any significant cardiac or pulmonary conditions. \par \par Patient denies pyrosis, dysphagia, abdominal pain, rectal bleeding, nausea, vomiting, or unexplained weight loss. \par

## 2022-09-22 NOTE — ASSESSMENT
[FreeTextEntry1] : Patient is a 77 year old female, with PMH HTN, HLD, who presents for follow up visit for worsening gas/bloating. Pt is belching and passing flatulence. She denies heartburn, acid reflux, abdominal pain. She gets intermittent loose stools from time to time. She had a colonoscopy in March 2022 which was normal except for hemorrhoids and diverticulosis. She does have a h/o colon polyps in 2012. \par \par Will order Lactulose breath test to r/o SIBO. Patient education about low FODMAP diet printed and given to patient for review. \par \par F.U with Dr. Davey in 3 month or next available

## 2022-09-27 ENCOUNTER — RX CHANGE (OUTPATIENT)
Age: 77
End: 2022-09-27

## 2022-09-27 RX ORDER — FLUTICASONE PROPIONATE 50 UG/1
50 SPRAY, METERED NASAL
Qty: 32 | Refills: 0 | Status: DISCONTINUED | COMMUNITY
Start: 2022-08-01 | End: 2022-09-27

## 2022-09-29 ENCOUNTER — APPOINTMENT (OUTPATIENT)
Dept: INTERNAL MEDICINE | Facility: CLINIC | Age: 77
End: 2022-09-29

## 2022-09-29 VITALS
DIASTOLIC BLOOD PRESSURE: 85 MMHG | WEIGHT: 172 LBS | BODY MASS INDEX: 28.66 KG/M2 | HEART RATE: 73 BPM | HEIGHT: 65 IN | RESPIRATION RATE: 16 BRPM | SYSTOLIC BLOOD PRESSURE: 133 MMHG

## 2022-09-29 DIAGNOSIS — E66.9 OBESITY, UNSPECIFIED: ICD-10-CM

## 2022-09-29 PROCEDURE — 36415 COLL VENOUS BLD VENIPUNCTURE: CPT

## 2022-09-29 PROCEDURE — 99214 OFFICE O/P EST MOD 30 MIN: CPT | Mod: 25

## 2022-09-29 PROCEDURE — G0008: CPT

## 2022-09-29 PROCEDURE — 90662 IIV NO PRSV INCREASED AG IM: CPT

## 2022-09-29 NOTE — HISTORY OF PRESENT ILLNESS
[FreeTextEntry1] : Follow up chronic medical conditions.  [de-identified] : Ms. ELIZABETH GAGNON is a 77 year female with a PMH of asthma and hld comes to the office for follow up of chronic medical conditions. Patient denies fever, cough SOB. No other complaints at this time.

## 2022-09-29 NOTE — HEALTH RISK ASSESSMENT
[0] : 2) Feeling down, depressed, or hopeless: Not at all (0) [PHQ-2 Negative - No further assessment needed] : PHQ-2 Negative - No further assessment needed [[id number]] : 0

## 2022-09-29 NOTE — PLAN
[FreeTextEntry1] : Patient received flu vaccine today. Patient advised of adverse effects of medication including but not limited to muscle soreness at site of injection and general malaise \par \par HTN- patient's BP well controlled with current medication. will continue current  regimen \par \par HLD- will test lipid panel and call patient with results. \par \par Prior to appointment and during encounter with patient extensive medical records were reviewed including but not limited to, Hospital records records, out patient records, laboratory data and microbiology data \par In addition extensive time was also spent in reviewing diagnostic studies.\par \par Total encounter total time 30 mins\par >50% of time spent counseling/coordinating care \par \par Counseling included abnormal lab results, differential diagnoses, treatment options, risks and benefits, lifestyle changes, current condition, medications, and dose adjustments. \par The patient was interactive, attentive, asked questions, and verbalized understanding

## 2022-09-30 LAB
25(OH)D3 SERPL-MCNC: 44.3 NG/ML
ALBUMIN SERPL ELPH-MCNC: 4 G/DL
ALP BLD-CCNC: 87 U/L
ALT SERPL-CCNC: 19 U/L
ANION GAP SERPL CALC-SCNC: 12 MMOL/L
AST SERPL-CCNC: 26 U/L
BASOPHILS # BLD AUTO: 0.06 K/UL
BASOPHILS NFR BLD AUTO: 0.9 %
BILIRUB SERPL-MCNC: 0.6 MG/DL
BUN SERPL-MCNC: 23 MG/DL
CALCIUM SERPL-MCNC: 9.5 MG/DL
CHLORIDE SERPL-SCNC: 105 MMOL/L
CHOLEST SERPL-MCNC: 170 MG/DL
CO2 SERPL-SCNC: 24 MMOL/L
CREAT SERPL-MCNC: 0.91 MG/DL
EGFR: 65 ML/MIN/1.73M2
EOSINOPHIL # BLD AUTO: 0.29 K/UL
EOSINOPHIL NFR BLD AUTO: 4.4 %
ESTIMATED AVERAGE GLUCOSE: 114 MG/DL
GLUCOSE SERPL-MCNC: 87 MG/DL
HBA1C MFR BLD HPLC: 5.6 %
HCT VFR BLD CALC: 42.1 %
HDLC SERPL-MCNC: 58 MG/DL
HGB BLD-MCNC: 13.7 G/DL
IMM GRANULOCYTES NFR BLD AUTO: 0.3 %
LDLC SERPL CALC-MCNC: 90 MG/DL
LYMPHOCYTES # BLD AUTO: 1.55 K/UL
LYMPHOCYTES NFR BLD AUTO: 23.6 %
MAN DIFF?: NORMAL
MCHC RBC-ENTMCNC: 32.5 GM/DL
MCHC RBC-ENTMCNC: 32.5 PG
MCV RBC AUTO: 99.8 FL
MONOCYTES # BLD AUTO: 0.55 K/UL
MONOCYTES NFR BLD AUTO: 8.4 %
NEUTROPHILS # BLD AUTO: 4.09 K/UL
NEUTROPHILS NFR BLD AUTO: 62.4 %
NONHDLC SERPL-MCNC: 112 MG/DL
PLATELET # BLD AUTO: 236 K/UL
POTASSIUM SERPL-SCNC: 4.6 MMOL/L
PROT SERPL-MCNC: 6.5 G/DL
RBC # BLD: 4.22 M/UL
RBC # FLD: 13.6 %
SODIUM SERPL-SCNC: 141 MMOL/L
TRIGL SERPL-MCNC: 109 MG/DL
TSH SERPL-ACNC: 1.15 UIU/ML
WBC # FLD AUTO: 6.56 K/UL

## 2022-10-04 ENCOUNTER — RX RENEWAL (OUTPATIENT)
Age: 77
End: 2022-10-04

## 2022-10-13 ENCOUNTER — APPOINTMENT (OUTPATIENT)
Dept: CARDIOLOGY | Facility: CLINIC | Age: 77
End: 2022-10-13

## 2022-10-13 ENCOUNTER — NON-APPOINTMENT (OUTPATIENT)
Age: 77
End: 2022-10-13

## 2022-10-13 VITALS
BODY MASS INDEX: 26.82 KG/M2 | DIASTOLIC BLOOD PRESSURE: 64 MMHG | RESPIRATION RATE: 16 BRPM | HEIGHT: 65 IN | HEART RATE: 75 BPM | WEIGHT: 161 LBS | SYSTOLIC BLOOD PRESSURE: 102 MMHG | OXYGEN SATURATION: 95 %

## 2022-10-13 DIAGNOSIS — R94.31 ABNORMAL ELECTROCARDIOGRAM [ECG] [EKG]: ICD-10-CM

## 2022-10-13 PROCEDURE — 99214 OFFICE O/P EST MOD 30 MIN: CPT

## 2022-10-13 PROCEDURE — 93000 ELECTROCARDIOGRAM COMPLETE: CPT

## 2022-10-13 NOTE — HISTORY OF PRESENT ILLNESS
[FreeTextEntry1] : Patient presents back to the office today feeling well.  She has been having a lot of issues with bloating and belching and follow-up with GI.  She was placed on a lactose and gluten-free diet and this seems to have helped very much.  With the change in her diet she has actually lost 10 pounds and hopes to lose another 10.  She continues to be active without any symptoms or limitations.  She has not been checking her blood pressure at home.  Patient denies chest pain, shortness of breath, palpitations, dizziness, lightheadedness, orthopnea, presyncope, syncope.

## 2022-10-13 NOTE — DISCUSSION/SUMMARY
[FreeTextEntry1] : 1.  No additional cardiac testing at this time.\par 2.  Continue current cardiac meds in doses as noted above for hypertension, PVCs and dyslipidemia.\par 3.  Monitor BP at home, keep a log and bring to f/u.\par 4.  Patient encouraged to find ways to reduce stress.\par 5.  Patient encouraged to work on diet to lose weight.\par 6.  Patient is encouraged to exercise at least 30 minutes a day everyday of the week.\par 7.  Follow up here in 4 months. [EKG obtained to assist in diagnosis and management of assessed problem(s)] : EKG obtained to assist in diagnosis and management of assessed problem(s)

## 2022-10-13 NOTE — ASSESSMENT
[FreeTextEntry1] : Echocardiogram December 22, 2021 demonstrated left ventricle normal size and function with ejection fraction of 60 to 65%.  Mild to moderate mitral, trace aortic, mild tricuspid and trace pulmonic regurgitation noted.\par \par Holter monitor December 15, 2021 demonstrated presenting rhythm is sinus with an average heart rate of 78 bpm, maximum 108, minimum 53 bpm.  Frequent PVCs were noted with a total of 306/h with bigeminy, trigeminy and 4 multifocal couplets.  12 total PACs were noted.  Patient reported no symptoms during the Holter.\par \par Holter monitor March 16, 2022 demonstrated presenting rhythm was sinus with an average heart rate of 70 bpm, maximum 98, minimum 50 bpm.  PVCs totaling 343/h were noted with some bigeminy and some trigeminy.  Rare PACs.\par \par Holter monitor June 21, 2022 presenting rhythm is sinus with an average heart rate of 61 bpm, maximum 89, minimum 47 bpm.  PVCs totaling 209/h were noted with some and trigeminy.  Total burden is just under 6%.  No other significant arrhythmia.\par \par Event monitor September 7 to September 21, 2022 showed a presenting rhythm of sinus with an average heart rate of 67 bpm, maximum 138, minimum 42 bpm.  Frequent PVCs noted totaling 7.79% of the total beats.  No other significant arrhythmia.\par \par EKG: Sinus rhythm with no significant ST or T wave changes.  Poor R wave progression.  PVCs noted.\par \par 77-year-old female with a past medical history of hypertension, dyslipidemia, frequent PVCs, obesity who presents today for follow-up evaluation.  Event monitor continues to show a PVC burden of just around 8%.  She remains completely asymptomatic at her last echocardiogram was normal.  At this time I do not see the need to make any changes to her medication or any further intervention for the PVCs but this will continue to be monitored closely.  Her blood pressure is on the low side as she has been losing weight and ultimately we may need to discontinue lisinopril if there is a need to increase her beta-blocker.  She will start monitoring her blood pressure at home.  Lipids are well controlled on her most recent blood work.

## 2022-10-20 ENCOUNTER — APPOINTMENT (OUTPATIENT)
Dept: DERMATOLOGY | Facility: CLINIC | Age: 77
End: 2022-10-20

## 2022-10-20 PROCEDURE — 99213 OFFICE O/P EST LOW 20 MIN: CPT

## 2022-11-03 ENCOUNTER — OFFICE (OUTPATIENT)
Dept: URBAN - METROPOLITAN AREA CLINIC 115 | Facility: CLINIC | Age: 77
Setting detail: OPHTHALMOLOGY
End: 2022-11-03
Payer: MEDICARE

## 2022-11-03 DIAGNOSIS — H25.13: ICD-10-CM

## 2022-11-03 DIAGNOSIS — H16.223: ICD-10-CM

## 2022-11-03 PROCEDURE — 92014 COMPRE OPH EXAM EST PT 1/>: CPT | Performed by: OPTOMETRIST

## 2022-11-03 PROCEDURE — 92250 FUNDUS PHOTOGRAPHY W/I&R: CPT | Performed by: OPTOMETRIST

## 2022-11-03 ASSESSMENT — SUPERFICIAL PUNCTATE KERATITIS (SPK)
OS_SPK: 1+
OD_SPK: 1+

## 2022-11-03 ASSESSMENT — CONFRONTATIONAL VISUAL FIELD TEST (CVF)
OD_FINDINGS: FULL
OS_FINDINGS: FULL

## 2022-11-03 ASSESSMENT — PACHYMETRY
OD_CT_UM: 564
OD_CT_CORRECTION: -1
OS_CT_CORRECTION: -2
OS_CT_UM: 575

## 2022-11-17 ASSESSMENT — REFRACTION_CURRENTRX
OD_CYLINDER: -3.25
OD_AXIS: 099
OD_VPRISM_DIRECTION: PROGS
OD_OVR_VA: 20/
OS_OVR_VA: 20/
OD_VPRISM_DIRECTION: SV
OS_AXIS: 091
OD_OVR_VA: 20/
OS_CYLINDER: -2.00
OS_VPRISM_DIRECTION: SV
OS_AXIS: 083
OS_ADD: +2.75
OD_SPHERE: +1.50
OD_CYLINDER: -2.00
OS_SPHERE: PLANO
OS_OVR_VA: 20/
OD_AXIS: 107
OD_SPHERE: +1.25
OD_ADD: +2.75
OS_CYLINDER: -1.75
OS_SPHERE: +1.00
OS_VPRISM_DIRECTION: PROGS

## 2022-11-17 ASSESSMENT — REFRACTION_AUTOREFRACTION
OD_SPHERE: +1.00
OS_CYLINDER: -2.50
OS_SPHERE: +0.75
OD_CYLINDER: -2.75
OS_AXIS: 084
OD_AXIS: 101

## 2022-11-17 ASSESSMENT — VISUAL ACUITY
OD_BCVA: 20/25-2
OS_BCVA: 20/20-

## 2022-11-17 ASSESSMENT — SPHEQUIV_DERIVED
OD_SPHEQUIV: -0.375
OS_SPHEQUIV: -0.5

## 2022-11-22 ENCOUNTER — RX CHANGE (OUTPATIENT)
Age: 77
End: 2022-11-22

## 2022-11-22 RX ORDER — FLUTICASONE PROPIONATE 50 UG/1
50 SPRAY, METERED NASAL
Qty: 32 | Refills: 0 | Status: DISCONTINUED | COMMUNITY
Start: 2022-09-27 | End: 2022-11-22

## 2022-12-05 ENCOUNTER — RX RENEWAL (OUTPATIENT)
Age: 77
End: 2022-12-05

## 2022-12-09 ENCOUNTER — APPOINTMENT (OUTPATIENT)
Dept: GASTROENTEROLOGY | Facility: CLINIC | Age: 77
End: 2022-12-09

## 2022-12-09 VITALS
HEIGHT: 65 IN | RESPIRATION RATE: 16 BRPM | WEIGHT: 164 LBS | OXYGEN SATURATION: 98 % | DIASTOLIC BLOOD PRESSURE: 80 MMHG | SYSTOLIC BLOOD PRESSURE: 118 MMHG | TEMPERATURE: 97.3 F | HEART RATE: 69 BPM | BODY MASS INDEX: 27.32 KG/M2

## 2022-12-09 DIAGNOSIS — Z09 ENCOUNTER FOR FOLLOW-UP EXAMINATION AFTER COMPLETED TREATMENT FOR CONDITIONS OTHER THAN MALIGNANT NEOPLASM: ICD-10-CM

## 2022-12-09 PROCEDURE — 99214 OFFICE O/P EST MOD 30 MIN: CPT

## 2022-12-09 NOTE — REASON FOR VISIT
[Follow-up] : a follow-up of an existing diagnosis [FreeTextEntry1] : Patient with gas bloating flatulence belching intermittent loose stools

## 2022-12-09 NOTE — HISTORY OF PRESENT ILLNESS
[FreeTextEntry1] : Patient with a history of gas bloating medius and intermittent loose stools.  Patient has been taking Benefiber and probiotics.  Symptoms are essentially unchanged.  Colonoscopy in March 2022 showed internal hemorrhoids and diverticulosis.  She did have a history of colon polyps in 2012.  She tried the FODMAP diet with not much change in her symptoms.  Her lactulose breath test was negative for small intestinal bacterial overgrowth.  In September 2022 her CBC CMP was normal

## 2022-12-09 NOTE — PHYSICAL EXAM
[Alert] : alert [Normal Voice/Communication] : normal voice/communication [Healthy Appearing] : healthy appearing [No Acute Distress] : no acute distress [No Respiratory Distress] : no respiratory distress [No Acc Muscle Use] : no accessory muscle use [Respiration, Rhythm And Depth] : normal respiratory rhythm and effort [Auscultation Breath Sounds / Voice Sounds] : lungs were clear to auscultation bilaterally [Heart Rate And Rhythm] : heart rate was normal and rhythm regular [Normal S1, S2] : normal S1 and S2 [Murmurs] : no murmurs [Bowel Sounds] : normal bowel sounds [Abdomen Tenderness] : non-tender [No Masses] : no abdominal mass palpated [Abdomen Soft] : soft [Oriented To Time, Place, And Person] : oriented to person, place, and time

## 2022-12-09 NOTE — ASSESSMENT
[FreeTextEntry1] : A/P\par Patient with IBS–bloating\par Negative for SIBO\par Will try Levsin 1 pill in the morning then as needed\par History of diverticulosis–high-fiber diet\par History of colon polyps in 2012–at this point her last colonoscopy was negative for polyps in March 2022.  We will not do further routine colonoscopy.  We will do colonoscopy as needed\par \par Follow-up in 3 months

## 2023-01-06 ENCOUNTER — APPOINTMENT (OUTPATIENT)
Dept: INTERNAL MEDICINE | Facility: CLINIC | Age: 78
End: 2023-01-06
Payer: MEDICARE

## 2023-01-06 VITALS
SYSTOLIC BLOOD PRESSURE: 100 MMHG | TEMPERATURE: 97.9 F | OXYGEN SATURATION: 97 % | HEIGHT: 65 IN | BODY MASS INDEX: 26.33 KG/M2 | HEART RATE: 88 BPM | DIASTOLIC BLOOD PRESSURE: 60 MMHG | WEIGHT: 158 LBS

## 2023-01-06 DIAGNOSIS — J01.00 ACUTE MAXILLARY SINUSITIS, UNSPECIFIED: ICD-10-CM

## 2023-01-06 PROCEDURE — 99214 OFFICE O/P EST MOD 30 MIN: CPT

## 2023-01-06 NOTE — HISTORY OF PRESENT ILLNESS
[FreeTextEntry1] : follow up chronic medical conditions.  [de-identified] : Ms. ELIZABETH GAGNON is a 77 year female with a PMH of asthma and hld comes to the office  c/o  sinus congestion and nasal discharge x 10 days. Patient denies fever, cough SOB. No other complaints at this time.

## 2023-01-06 NOTE — PLAN
Normal [FreeTextEntry1] : Patient with sinusitis, will start antibiotics and intranasal steroid today. \par \par HTN- patient's BP well controlled with current medication. will continue current  regimen \par \par \par Prior to appointment and during encounter with patient extensive medical records were reviewed including but not limited to, Hospital records records, out patient records, laboratory data and microbiology data \par In addition extensive time was also spent in reviewing diagnostic studies.\par \par Total encounter total time 30 mins\par >50% of time spent counseling/coordinating care \par \par Counseling included abnormal lab results, differential diagnoses, treatment options, risks and benefits, lifestyle changes, current condition, medications, and dose adjustments. \par The patient was interactive, attentive, asked questions, and verbalized understanding

## 2023-01-06 NOTE — HEALTH RISK ASSESSMENT
[Never] : Never [0] : 2) Feeling down, depressed, or hopeless: Not at all (0) [PHQ-2 Negative - No further assessment needed] : PHQ-2 Negative - No further assessment needed [[id number]] : 0

## 2023-01-06 NOTE — PHYSICAL EXAM
[No Acute Distress] : no acute distress [Well Nourished] : well nourished [Well Developed] : well developed [Well-Appearing] : well-appearing [Normal Sclera/Conjunctiva] : normal sclera/conjunctiva [PERRL] : pupils equal round and reactive to light [EOMI] : extraocular movements intact [Normal Outer Ear/Nose] : the outer ears and nose were normal in appearance [No JVD] : no jugular venous distention [No Lymphadenopathy] : no lymphadenopathy [Supple] : supple [No Respiratory Distress] : no respiratory distress  [No Accessory Muscle Use] : no accessory muscle use [Clear to Auscultation] : lungs were clear to auscultation bilaterally [Normal Rate] : normal rate  [Regular Rhythm] : with a regular rhythm [Normal S1, S2] : normal S1 and S2 [No Carotid Bruits] : no carotid bruits [No Abdominal Bruit] : a ~M bruit was not heard ~T in the abdomen [No Varicosities] : no varicosities [No Edema] : there was no peripheral edema [No Palpable Aorta] : no palpable aorta [No Extremity Clubbing/Cyanosis] : no extremity clubbing/cyanosis [Soft] : abdomen soft [Non Tender] : non-tender [Non-distended] : non-distended [No HSM] : no HSM [Normal Bowel Sounds] : normal bowel sounds [Normal Posterior Cervical Nodes] : no posterior cervical lymphadenopathy [Normal Anterior Cervical Nodes] : no anterior cervical lymphadenopathy [No CVA Tenderness] : no CVA  tenderness [No Spinal Tenderness] : no spinal tenderness [No Joint Swelling] : no joint swelling [Grossly Normal Strength/Tone] : grossly normal strength/tone [No Rash] : no rash [Coordination Grossly Intact] : coordination grossly intact [No Focal Deficits] : no focal deficits [Normal Gait] : normal gait [Normal Affect] : the affect was normal [Normal Insight/Judgement] : insight and judgment were intact [de-identified] : fullness of TM bilaterally, cobblestoning of pharynx

## 2023-01-24 ENCOUNTER — RX RENEWAL (OUTPATIENT)
Age: 78
End: 2023-01-24

## 2023-03-10 ENCOUNTER — APPOINTMENT (OUTPATIENT)
Dept: GASTROENTEROLOGY | Facility: CLINIC | Age: 78
End: 2023-03-10
Payer: MEDICARE

## 2023-03-10 VITALS
HEART RATE: 79 BPM | HEIGHT: 65 IN | RESPIRATION RATE: 16 BRPM | DIASTOLIC BLOOD PRESSURE: 66 MMHG | TEMPERATURE: 97.8 F | BODY MASS INDEX: 27.32 KG/M2 | WEIGHT: 164 LBS | SYSTOLIC BLOOD PRESSURE: 104 MMHG | OXYGEN SATURATION: 98 %

## 2023-03-10 DIAGNOSIS — R14.0 ABDOMINAL DISTENSION (GASEOUS): ICD-10-CM

## 2023-03-10 DIAGNOSIS — R19.7 DIARRHEA, UNSPECIFIED: ICD-10-CM

## 2023-03-10 PROCEDURE — 99214 OFFICE O/P EST MOD 30 MIN: CPT

## 2023-03-10 RX ORDER — DOXYCYCLINE HYCLATE 100 MG/1
100 TABLET ORAL TWICE DAILY
Qty: 20 | Refills: 0 | Status: DISCONTINUED | COMMUNITY
Start: 2023-01-06 | End: 2023-03-10

## 2023-03-10 RX ORDER — BENZONATATE 200 MG/1
200 CAPSULE ORAL
Qty: 30 | Refills: 0 | Status: DISCONTINUED | COMMUNITY
Start: 2023-01-06 | End: 2023-03-10

## 2023-03-10 NOTE — ASSESSMENT
[FreeTextEntry1] : ELIZABETH GAGNON is a 77 year year old female who presents today for follow visit for abdominal bloating and intermittent loose stools. \par \par Symptoms have improved with current regimen of Benefiber and Levsin. Continue current regimen. \par Continue high fiber diet for diverticulosis.\par Pt has a history of colon polyps in 2012. Colonoscopy in 2022 was negative for polyps. No further routine colonoscopy required. Will do colonoscopy PRN.\par \par Follow up in 6 months\par \par I, Dr. Areli Davey, was present during history and physical\par Patient's symptoms of bloating have resolved with Levsin.  I agree with the above assessment and plan

## 2023-03-10 NOTE — PHYSICAL EXAM

## 2023-03-10 NOTE — HISTORY OF PRESENT ILLNESS
[FreeTextEntry1] : ELIZABETH GAGNON is a 77 year year old female who presents today for follow visit for abdominal bloating and intermittent loose stools. Since last visit she was started on Benefiber and Levsin. Pt reports improvement of symptoms with current regimen. She takes Levsin 2-3 times per day. She has regular BMs daily. Previously pt has tried the FODMAP diet which did not improve symptoms. SIBO was negative. Denies abdominal pain, constipation, diarrhea, rectal bleeding, melena, black stools, unintentional weight loss, nausea, vomiting, GERD, or dysphagia. \par \par Last colonoscopy was in 2022 which was significant for diverticulosis and internal hemorrhoids. Pt has had colon polyps on previous colonoscopies.

## 2023-03-10 NOTE — ADDENDUM
[FreeTextEntry1] : I, Malgorzata Saxena NP, acted as scribe for BINDU Mars for this patient encounter.

## 2023-03-10 NOTE — REASON FOR VISIT
[Follow-up] : a follow-up of an existing diagnosis [FreeTextEntry1] : gas bloating and intermittent loose stools

## 2023-03-17 ENCOUNTER — APPOINTMENT (OUTPATIENT)
Dept: GASTROENTEROLOGY | Facility: CLINIC | Age: 78
End: 2023-03-17

## 2023-04-05 ENCOUNTER — APPOINTMENT (OUTPATIENT)
Dept: DERMATOLOGY | Facility: CLINIC | Age: 78
End: 2023-04-05
Payer: MEDICARE

## 2023-04-05 PROCEDURE — 99213 OFFICE O/P EST LOW 20 MIN: CPT

## 2023-04-19 ENCOUNTER — NON-APPOINTMENT (OUTPATIENT)
Age: 78
End: 2023-04-19

## 2023-04-19 ENCOUNTER — APPOINTMENT (OUTPATIENT)
Dept: CARDIOLOGY | Facility: CLINIC | Age: 78
End: 2023-04-19
Payer: MEDICARE

## 2023-04-19 VITALS
WEIGHT: 161 LBS | HEIGHT: 65 IN | HEART RATE: 71 BPM | OXYGEN SATURATION: 95 % | RESPIRATION RATE: 16 BRPM | SYSTOLIC BLOOD PRESSURE: 140 MMHG | DIASTOLIC BLOOD PRESSURE: 80 MMHG | BODY MASS INDEX: 26.82 KG/M2

## 2023-04-19 DIAGNOSIS — R03.0 ELEVATED BLOOD-PRESSURE READING, W/OUT DIAGNOSIS OF HYPERTENSION: ICD-10-CM

## 2023-04-19 PROCEDURE — 99214 OFFICE O/P EST MOD 30 MIN: CPT

## 2023-04-19 PROCEDURE — 93000 ELECTROCARDIOGRAM COMPLETE: CPT

## 2023-04-19 NOTE — DISCUSSION/SUMMARY
[FreeTextEntry1] : 1.  No additional cardiac testing at this time.  Plan echocardiogram later this year to reevaluate in the setting of frequent PVCs.\par 2.  She will bring her machine in to have it calibrated and if it is accurate we will discontinue lisinopril.  Continue other current cardiac meds in doses as noted above for hypertension, PVCs and dyslipidemia.\par 3.  Monitor BP at home, keep a log and bring to f/u.\par 4.  Patient encouraged to find ways to reduce stress.\par 5.  Patient encouraged to work on diet to lose weight.\par 6.  Patient is encouraged to exercise at least 30 minutes a day everyday of the week.\par 7.  Follow up here in 4 months. [EKG obtained to assist in diagnosis and management of assessed problem(s)] : EKG obtained to assist in diagnosis and management of assessed problem(s)

## 2023-04-19 NOTE — ASSESSMENT
[FreeTextEntry1] : Echocardiogram December 22, 2021 demonstrated left ventricle normal size and function with ejection fraction of 60 to 65%.  Mild to moderate mitral, trace aortic, mild tricuspid and trace pulmonic regurgitation noted.\par \par Holter monitor December 15, 2021 demonstrated presenting rhythm is sinus with an average heart rate of 78 bpm, maximum 108, minimum 53 bpm.  Frequent PVCs were noted with a total of 306/h with bigeminy, trigeminy and 4 multifocal couplets.  12 total PACs were noted.  Patient reported no symptoms during the Holter.\par \par Holter monitor March 16, 2022 demonstrated presenting rhythm was sinus with an average heart rate of 70 bpm, maximum 98, minimum 50 bpm.  PVCs totaling 343/h were noted with some bigeminy and some trigeminy.  Rare PACs.\par \par Holter monitor June 21, 2022 presenting rhythm is sinus with an average heart rate of 61 bpm, maximum 89, minimum 47 bpm.  PVCs totaling 209/h were noted with some and trigeminy.  Total burden is just under 6%.  No other significant arrhythmia.\[facility] \par Event monitor September 7 to September 21, 2022 showed a presenting rhythm of sinus with an average heart rate of 67 bpm, maximum 138, minimum 42 bpm.  Frequent PVCs noted totaling 7.79% of the total beats.  No other significant arrhythmia.\par \par EKG: Sinus rhythm with no significant ST or T wave changes.  Poor R wave progression.  PVCs noted.\par \par 77-year-old female with a past medical history of hypertension, dyslipidemia, frequent PVCs, obesity who presents today for follow-up evaluation.  Patient appears to be doing very well at this time.  Her EKG again shows frequent PVCs but these remain completely asymptomatic and monitor from September showed a burden under 8%.  No need for further work-up at this time.  Her blood pressures continue to run on the low side and even though she is not symptomatic I believe we could likely discontinue lisinopril.  She will bring her machine in to have it calibrated and sure it is accurate and if it is she can discontinue lisinopril and continue on metoprolol.  If there are more PVCs we may need to ultimately increase this so that would allow us to do so if needed.  EKG is otherwise unremarkable.  Most recent blood work shows good control of her lipids.

## 2023-04-19 NOTE — PHYSICAL EXAM
[Well Developed] : well developed [Well Nourished] : well nourished [Normal Conjunctiva] : normal conjunctiva [No Acute Distress] : no acute distress [Normal Venous Pressure] : normal venous pressure [No Carotid Bruit] : no carotid bruit [Normal S1, S2] : normal S1, S2 [No Murmur] : no murmur [No Rub] : no rub [No Gallop] : no gallop [S4] : S4 [Clear Lung Fields] : clear lung fields [Good Air Entry] : good air entry [No Respiratory Distress] : no respiratory distress  [Soft] : abdomen soft [Non Tender] : non-tender [No Masses/organomegaly] : no masses/organomegaly [Normal Bowel Sounds] : normal bowel sounds [Normal Gait] : normal gait [No Edema] : no edema [No Cyanosis] : no cyanosis [No Clubbing] : no clubbing [No Varicosities] : no varicosities [Moves all extremities] : moves all extremities [No Focal Deficits] : no focal deficits [Normal Speech] : normal speech [Alert and Oriented] : alert and oriented [Normal memory] : normal memory

## 2023-04-19 NOTE — HISTORY OF PRESENT ILLNESS
[FreeTextEntry1] : Patient presents back to the office today feeling very well and offers no complaints.  She continues to be active and is able to do all of her activities without any symptoms limitations at all.  Blood pressures been in the 100-120s over 60s to 70s.  She reports no dizziness or lightheadedness.  Patient denies chest pain, shortness of breath, palpitations, orthopnea, presyncope, syncope.

## 2023-04-26 ENCOUNTER — APPOINTMENT (OUTPATIENT)
Dept: CARDIOLOGY | Facility: CLINIC | Age: 78
End: 2023-04-26
Payer: MEDICARE

## 2023-04-26 PROCEDURE — ZZZZZ: CPT

## 2023-06-01 ENCOUNTER — RX RENEWAL (OUTPATIENT)
Age: 78
End: 2023-06-01

## 2023-07-11 ENCOUNTER — RX RENEWAL (OUTPATIENT)
Age: 78
End: 2023-07-11

## 2023-07-20 ENCOUNTER — OFFICE (OUTPATIENT)
Dept: URBAN - METROPOLITAN AREA CLINIC 115 | Facility: CLINIC | Age: 78
Setting detail: OPHTHALMOLOGY
End: 2023-07-20
Payer: MEDICARE

## 2023-07-20 DIAGNOSIS — Q14.1: ICD-10-CM

## 2023-07-20 PROCEDURE — 92014 COMPRE OPH EXAM EST PT 1/>: CPT | Performed by: SPECIALIST

## 2023-07-20 PROCEDURE — 92134 CPTRZ OPH DX IMG PST SGM RTA: CPT | Performed by: SPECIALIST

## 2023-07-20 ASSESSMENT — REFRACTION_CURRENTRX
OD_VPRISM_DIRECTION: SV
OD_VPRISM_DIRECTION: PROGS
OS_ADD: +2.75
OS_OVR_VA: 20/
OD_AXIS: 099
OS_VPRISM_DIRECTION: PROGS
OD_AXIS: 107
OS_CYLINDER: -2.00
OD_OVR_VA: 20/
OS_AXIS: 091
OD_CYLINDER: -2.00
OD_OVR_VA: 20/
OS_CYLINDER: -1.75
OS_AXIS: 083
OS_OVR_VA: 20/
OS_VPRISM_DIRECTION: SV
OD_SPHERE: +1.25
OD_ADD: +2.75
OS_SPHERE: +1.00
OS_SPHERE: PLANO
OD_CYLINDER: -3.25
OD_SPHERE: +1.50

## 2023-07-20 ASSESSMENT — CONFRONTATIONAL VISUAL FIELD TEST (CVF)
OD_FINDINGS: FULL
OS_FINDINGS: FULL

## 2023-07-20 ASSESSMENT — PACHYMETRY
OS_CT_UM: 575
OD_CT_CORRECTION: -1
OS_CT_CORRECTION: -2
OD_CT_UM: 564

## 2023-07-20 ASSESSMENT — SUPERFICIAL PUNCTATE KERATITIS (SPK)
OS_SPK: 1+
OD_SPK: 1+

## 2023-07-20 ASSESSMENT — SPHEQUIV_DERIVED
OD_SPHEQUIV: -0.375
OS_SPHEQUIV: -0.5

## 2023-07-20 ASSESSMENT — REFRACTION_AUTOREFRACTION
OD_SPHERE: +1.00
OD_AXIS: 101
OS_CYLINDER: -2.50
OS_SPHERE: +0.75
OS_AXIS: 084
OD_CYLINDER: -2.75

## 2023-07-20 ASSESSMENT — VISUAL ACUITY
OD_BCVA: 20/30-1
OS_BCVA: 20/25

## 2023-07-20 ASSESSMENT — TONOMETRY
OS_IOP_MMHG: 20
OD_IOP_MMHG: 19

## 2023-08-18 ENCOUNTER — NON-APPOINTMENT (OUTPATIENT)
Age: 78
End: 2023-08-18

## 2023-08-18 ENCOUNTER — APPOINTMENT (OUTPATIENT)
Dept: CARDIOLOGY | Facility: CLINIC | Age: 78
End: 2023-08-18
Payer: MEDICARE

## 2023-08-18 VITALS
WEIGHT: 168 LBS | BODY MASS INDEX: 27.99 KG/M2 | RESPIRATION RATE: 16 BRPM | HEART RATE: 69 BPM | SYSTOLIC BLOOD PRESSURE: 138 MMHG | HEIGHT: 65 IN | DIASTOLIC BLOOD PRESSURE: 80 MMHG

## 2023-08-18 PROCEDURE — 99214 OFFICE O/P EST MOD 30 MIN: CPT

## 2023-08-18 PROCEDURE — 93000 ELECTROCARDIOGRAM COMPLETE: CPT

## 2023-08-18 RX ORDER — FLUTICASONE PROPIONATE 50 UG/1
50 SPRAY, METERED NASAL TWICE DAILY
Qty: 1 | Refills: 1 | Status: DISCONTINUED | COMMUNITY
Start: 2023-01-06 | End: 2023-08-18

## 2023-08-18 NOTE — DISCUSSION/SUMMARY
[FreeTextEntry1] : 1.  Echocardiogram to reevaluate LV in the setting of frequent PVCs. 2.  Continue current cardiac meds in doses as noted above for hypertension, PVCs and dyslipidemia. 3.  Monitor BP at home, keep a log and bring to f/u. She will bring her machine in to have it calibrated. 4.  Patient encouraged to find ways to reduce stress. 5.  Patient encouraged to work on diet to lose weight. 6.  Patient is encouraged to exercise at least 30 minutes a day everyday of the week. 7.  Follow up here in 4 months. [EKG obtained to assist in diagnosis and management of assessed problem(s)] : EKG obtained to assist in diagnosis and management of assessed problem(s)

## 2023-08-18 NOTE — ASSESSMENT
[FreeTextEntry1] : Echocardiogram December 22, 2021 demonstrated left ventricle normal size and function with ejection fraction of 60 to 65%.  Mild to moderate mitral, trace aortic, mild tricuspid and trace pulmonic regurgitation noted.  Holter monitor December 15, 2021 demonstrated presenting rhythm is sinus with an average heart rate of 78 bpm, maximum 108, minimum 53 bpm.  Frequent PVCs were noted with a total of 306/h with bigeminy, trigeminy and 4 multifocal couplets.  12 total PACs were noted.  Patient reported no symptoms during the Holter.  Holter monitor March 16, 2022 demonstrated presenting rhythm was sinus with an average heart rate of 70 bpm, maximum 98, minimum 50 bpm.  PVCs totaling 343/h were noted with some bigeminy and some trigeminy.  Rare PACs.  Holter monitor June 21, 2022 presenting rhythm is sinus with an average heart rate of 61 bpm, maximum 89, minimum 47 bpm.  PVCs totaling 209/h were noted with some and trigeminy.  Total burden is just under 6%.  No other significant arrhythmia.  Event monitor September 7 to September 21, 2022 showed a presenting rhythm of sinus with an average heart rate of 67 bpm, maximum 138, minimum 42 bpm.  Frequent PVCs noted totaling 7.79% of the total beats.  No other significant arrhythmia.  EKG: Sinus rhythm with no significant ST or T wave changes.  Poor R wave progression.  78-year-old female with a past medical history of hypertension, dyslipidemia, frequent PVCs, obesity who presents today for follow-up evaluation.  Patient appears to be doing well at this time.  EKG today shows no PVCs at all.  Blood pressure appears to be well controlled off lisinopril.  She is not having any symptoms.  I have recommended echocardiogram to reevaluate EF given the known PVCs and she will do this prior to follow-up.  No evidence of heart failure at this time.  She is due for blood work with her primary and we will follow-up with him for a physical and blood work.

## 2023-08-18 NOTE — HISTORY OF PRESENT ILLNESS
[FreeTextEntry1] : Patient presents back to the office today feeling well.  She continues to do some working although less than she was in the spending more time watching her 3-year-old grandson.  She feels well and doing all of these daily activities without a problem.  Blood pressures been in the 110s to 120s over 60s to 70s.  Patient denies chest pain, shortness of breath, palpitations, orthopnea, presyncope, syncope.

## 2023-09-07 ENCOUNTER — APPOINTMENT (OUTPATIENT)
Dept: GASTROENTEROLOGY | Facility: CLINIC | Age: 78
End: 2023-09-07
Payer: MEDICARE

## 2023-09-07 VITALS
RESPIRATION RATE: 14 BRPM | BODY MASS INDEX: 27.99 KG/M2 | HEIGHT: 65 IN | SYSTOLIC BLOOD PRESSURE: 124 MMHG | DIASTOLIC BLOOD PRESSURE: 76 MMHG | HEART RATE: 78 BPM | OXYGEN SATURATION: 97 % | WEIGHT: 168 LBS

## 2023-09-07 PROCEDURE — 99214 OFFICE O/P EST MOD 30 MIN: CPT

## 2023-09-07 NOTE — ASSESSMENT
[FreeTextEntry1] : A/P IBS Symptoms controlled with Benefiber once a day and Levsin twice daily Will renew Levsin History of colon polyps–last colonoscopy 2022 negative.  No further colonoscopies unless patient has new symptoms, rectal bleeding, anemia

## 2023-09-07 NOTE — HISTORY OF PRESENT ILLNESS
[FreeTextEntry1] : Patient with gas bloating intermittent loose stools.  Currently symptoms are controlled with Benefiber and Levsin twice daily.  She had no response with FODMAP.  Her SIBO test was negative.  She had a remote history of colon polyps.  Colonoscopy 2022 was negative for polyps.  September 2022 hemoglobin 13.7 LFTs normal.

## 2023-09-25 ENCOUNTER — RX RENEWAL (OUTPATIENT)
Age: 78
End: 2023-09-25

## 2023-10-03 ENCOUNTER — RX RENEWAL (OUTPATIENT)
Age: 78
End: 2023-10-03

## 2023-10-04 ENCOUNTER — APPOINTMENT (OUTPATIENT)
Dept: DERMATOLOGY | Facility: CLINIC | Age: 78
End: 2023-10-04
Payer: MEDICARE

## 2023-10-04 PROCEDURE — 99213 OFFICE O/P EST LOW 20 MIN: CPT

## 2023-10-11 ENCOUNTER — APPOINTMENT (OUTPATIENT)
Dept: INTERNAL MEDICINE | Facility: CLINIC | Age: 78
End: 2023-10-11
Payer: MEDICARE

## 2023-10-11 VITALS
SYSTOLIC BLOOD PRESSURE: 140 MMHG | WEIGHT: 167 LBS | DIASTOLIC BLOOD PRESSURE: 70 MMHG | BODY MASS INDEX: 27.82 KG/M2 | HEIGHT: 65 IN

## 2023-10-11 DIAGNOSIS — R79.9 ABNORMAL FINDING OF BLOOD CHEMISTRY, UNSPECIFIED: ICD-10-CM

## 2023-10-11 DIAGNOSIS — Z23 ENCOUNTER FOR IMMUNIZATION: ICD-10-CM

## 2023-10-11 DIAGNOSIS — Z00.00 ENCOUNTER FOR GENERAL ADULT MEDICAL EXAMINATION W/OUT ABNORMAL FINDINGS: ICD-10-CM

## 2023-10-11 DIAGNOSIS — R73.03 PREDIABETES.: ICD-10-CM

## 2023-10-11 DIAGNOSIS — E03.8 OTHER SPECIFIED HYPOTHYROIDISM: ICD-10-CM

## 2023-10-11 DIAGNOSIS — E55.9 VITAMIN D DEFICIENCY, UNSPECIFIED: ICD-10-CM

## 2023-10-11 PROCEDURE — 90677 PCV20 VACCINE IM: CPT

## 2023-10-11 PROCEDURE — 90662 IIV NO PRSV INCREASED AG IM: CPT

## 2023-10-11 PROCEDURE — G0439: CPT

## 2023-10-11 PROCEDURE — G0009: CPT

## 2023-10-11 PROCEDURE — 36415 COLL VENOUS BLD VENIPUNCTURE: CPT

## 2023-10-11 PROCEDURE — G0008: CPT

## 2023-10-11 RX ORDER — HYOSCYAMINE SULFATE 0.12 MG/1
0.12 TABLET SUBLINGUAL
Refills: 0 | Status: ACTIVE | COMMUNITY

## 2023-10-12 LAB
25(OH)D3 SERPL-MCNC: 35.9 NG/ML
ALBUMIN SERPL ELPH-MCNC: 4.1 G/DL
ALP BLD-CCNC: 75 U/L
ALT SERPL-CCNC: 20 U/L
ANION GAP SERPL CALC-SCNC: 10 MMOL/L
AST SERPL-CCNC: 28 U/L
BASOPHILS # BLD AUTO: 0.05 K/UL
BASOPHILS NFR BLD AUTO: 0.8 %
BILIRUB SERPL-MCNC: 0.4 MG/DL
BUN SERPL-MCNC: 19 MG/DL
CALCIUM SERPL-MCNC: 9.5 MG/DL
CHLORIDE SERPL-SCNC: 103 MMOL/L
CHOLEST SERPL-MCNC: 162 MG/DL
CO2 SERPL-SCNC: 27 MMOL/L
CREAT SERPL-MCNC: 0.96 MG/DL
EGFR: 60 ML/MIN/1.73M2
EOSINOPHIL # BLD AUTO: 0.21 K/UL
EOSINOPHIL NFR BLD AUTO: 3.5 %
ESTIMATED AVERAGE GLUCOSE: 114 MG/DL
GLUCOSE SERPL-MCNC: 99 MG/DL
HBA1C MFR BLD HPLC: 5.6 %
HCT VFR BLD CALC: 40.8 %
HDLC SERPL-MCNC: 62 MG/DL
HGB BLD-MCNC: 13.2 G/DL
IMM GRANULOCYTES NFR BLD AUTO: 0.2 %
LDLC SERPL CALC-MCNC: 84 MG/DL
LYMPHOCYTES # BLD AUTO: 2.14 K/UL
LYMPHOCYTES NFR BLD AUTO: 35.6 %
MAGNESIUM SERPL-MCNC: 1.7 MG/DL
MAN DIFF?: NORMAL
MCHC RBC-ENTMCNC: 31.3 PG
MCHC RBC-ENTMCNC: 32.4 GM/DL
MCV RBC AUTO: 96.7 FL
MONOCYTES # BLD AUTO: 0.35 K/UL
MONOCYTES NFR BLD AUTO: 5.8 %
NEUTROPHILS # BLD AUTO: 3.25 K/UL
NEUTROPHILS NFR BLD AUTO: 54.1 %
NONHDLC SERPL-MCNC: 100 MG/DL
PLATELET # BLD AUTO: 189 K/UL
POTASSIUM SERPL-SCNC: 4.4 MMOL/L
PROT SERPL-MCNC: 6.3 G/DL
RBC # BLD: 4.22 M/UL
RBC # FLD: 13.4 %
SODIUM SERPL-SCNC: 141 MMOL/L
TRIGL SERPL-MCNC: 90 MG/DL
TSH SERPL-ACNC: 1.66 UIU/ML
WBC # FLD AUTO: 6.01 K/UL

## 2023-10-24 ENCOUNTER — APPOINTMENT (OUTPATIENT)
Dept: CARDIOLOGY | Facility: CLINIC | Age: 78
End: 2023-10-24
Payer: MEDICARE

## 2023-10-24 PROCEDURE — 93306 TTE W/DOPPLER COMPLETE: CPT

## 2023-11-13 ENCOUNTER — RX RENEWAL (OUTPATIENT)
Age: 78
End: 2023-11-13

## 2023-12-04 ENCOUNTER — APPOINTMENT (OUTPATIENT)
Dept: CARDIOLOGY | Facility: CLINIC | Age: 78
End: 2023-12-04
Payer: MEDICARE

## 2023-12-04 VITALS
RESPIRATION RATE: 16 BRPM | WEIGHT: 171 LBS | HEART RATE: 73 BPM | BODY MASS INDEX: 28.49 KG/M2 | DIASTOLIC BLOOD PRESSURE: 82 MMHG | HEIGHT: 65 IN | SYSTOLIC BLOOD PRESSURE: 123 MMHG

## 2023-12-04 PROCEDURE — 99214 OFFICE O/P EST MOD 30 MIN: CPT

## 2023-12-04 PROCEDURE — 93000 ELECTROCARDIOGRAM COMPLETE: CPT

## 2023-12-05 ENCOUNTER — NON-APPOINTMENT (OUTPATIENT)
Age: 78
End: 2023-12-05

## 2023-12-07 ENCOUNTER — APPOINTMENT (OUTPATIENT)
Dept: CARDIOLOGY | Facility: CLINIC | Age: 78
End: 2023-12-07
Payer: MEDICARE

## 2023-12-07 PROCEDURE — ZZZZZ: CPT

## 2024-01-23 RX ORDER — ATORVASTATIN CALCIUM 20 MG/1
20 TABLET, FILM COATED ORAL
Qty: 90 | Refills: 1 | Status: ACTIVE | COMMUNITY
Start: 2020-08-26 | End: 1900-01-01

## 2024-03-01 ENCOUNTER — APPOINTMENT (OUTPATIENT)
Dept: GASTROENTEROLOGY | Facility: CLINIC | Age: 79
End: 2024-03-01
Payer: MEDICARE

## 2024-03-01 VITALS
BODY MASS INDEX: 28.49 KG/M2 | HEIGHT: 65 IN | SYSTOLIC BLOOD PRESSURE: 125 MMHG | OXYGEN SATURATION: 98 % | DIASTOLIC BLOOD PRESSURE: 70 MMHG | HEART RATE: 70 BPM | RESPIRATION RATE: 16 BRPM | WEIGHT: 171 LBS

## 2024-03-01 PROCEDURE — 99214 OFFICE O/P EST MOD 30 MIN: CPT

## 2024-03-01 NOTE — PHYSICAL EXAM
[Alert] : alert [Normal Voice/Communication] : normal voice/communication [Healthy Appearing] : healthy appearing [No Respiratory Distress] : no respiratory distress [No Acc Muscle Use] : no accessory muscle use [Respiration, Rhythm And Depth] : normal respiratory rhythm and effort [Auscultation Breath Sounds / Voice Sounds] : lungs were clear to auscultation bilaterally [Heart Rate And Rhythm] : heart rate was normal and rhythm regular [Normal S1, S2] : normal S1 and S2 [Bowel Sounds] : normal bowel sounds [Abdomen Tenderness] : non-tender [No Masses] : no abdominal mass palpated [Abdomen Soft] : soft [Oriented To Time, Place, And Person] : oriented to person, place, and time Unna Boot Text: An Unna boot was placed to help immobilize the limb and facilitate more rapid healing.

## 2024-03-01 NOTE — ASSESSMENT
[FreeTextEntry1] : A/P Patient with IBS, bloating Continue Levsin twice daily with Benefiber daily  Patient with remote history of colon polyps.  Colonoscopy in 2022 negative. Colonoscopy as needed-we will do no further routine screening colonoscopies  Follow-up in 6

## 2024-03-01 NOTE — HISTORY OF PRESENT ILLNESS
[FreeTextEntry1] : Patient with history of IBS for many years.  She has a history of high cholesterol, basal cell carcinoma  Patient has chronic symptoms of bloating.  Symptoms improved with Benefiber once a day Levsin twice daily.  No improvement with FODMAP diet.  SIBO breath test negative.  Patient has remote history of colon polyps.  Colonoscopy 2022 was negative for colon polyps.  CBC showed hemoglobin of 13.2 in October 2023

## 2024-03-28 ENCOUNTER — RX RENEWAL (OUTPATIENT)
Age: 79
End: 2024-03-28

## 2024-04-18 ENCOUNTER — APPOINTMENT (OUTPATIENT)
Dept: DERMATOLOGY | Facility: CLINIC | Age: 79
End: 2024-04-18
Payer: MEDICARE

## 2024-04-18 PROCEDURE — 99213 OFFICE O/P EST LOW 20 MIN: CPT

## 2024-04-18 PROCEDURE — D0051: CPT

## 2024-05-10 RX ORDER — METOPROLOL SUCCINATE 50 MG/1
50 TABLET, EXTENDED RELEASE ORAL
Qty: 180 | Refills: 1 | Status: ACTIVE | COMMUNITY
Start: 2022-01-05 | End: 1900-01-01

## 2024-06-04 ENCOUNTER — APPOINTMENT (OUTPATIENT)
Dept: CARDIOLOGY | Facility: CLINIC | Age: 79
End: 2024-06-04
Payer: MEDICARE

## 2024-06-04 ENCOUNTER — NON-APPOINTMENT (OUTPATIENT)
Age: 79
End: 2024-06-04

## 2024-06-04 VITALS
RESPIRATION RATE: 16 BRPM | BODY MASS INDEX: 29.16 KG/M2 | SYSTOLIC BLOOD PRESSURE: 115 MMHG | DIASTOLIC BLOOD PRESSURE: 78 MMHG | WEIGHT: 175 LBS | HEART RATE: 74 BPM | HEIGHT: 65 IN

## 2024-06-04 DIAGNOSIS — I49.3 VENTRICULAR PREMATURE DEPOLARIZATION: ICD-10-CM

## 2024-06-04 DIAGNOSIS — E78.5 HYPERLIPIDEMIA, UNSPECIFIED: ICD-10-CM

## 2024-06-04 DIAGNOSIS — I10 ESSENTIAL (PRIMARY) HYPERTENSION: ICD-10-CM

## 2024-06-04 PROCEDURE — 93000 ELECTROCARDIOGRAM COMPLETE: CPT

## 2024-06-04 PROCEDURE — G2211 COMPLEX E/M VISIT ADD ON: CPT

## 2024-06-04 PROCEDURE — 99214 OFFICE O/P EST MOD 30 MIN: CPT

## 2024-06-04 RX ORDER — FLUTICASONE PROPIONATE 50 UG/1
50 SPRAY, METERED NASAL
Qty: 48 | Refills: 3 | Status: DISCONTINUED | COMMUNITY
Start: 2022-11-22 | End: 2024-06-04

## 2024-06-04 RX ORDER — LISINOPRIL 10 MG/1
10 TABLET ORAL
Qty: 90 | Refills: 3 | Status: DISCONTINUED | COMMUNITY
Start: 2021-06-28 | End: 2024-06-04

## 2024-06-04 RX ORDER — HYOSCYAMINE SULFATE 0.12 MG/1
0.12 TABLET ORAL EVERY 4 HOURS
Qty: 540 | Refills: 1 | Status: DISCONTINUED | COMMUNITY
Start: 2022-12-09 | End: 2024-06-04

## 2024-06-04 NOTE — DISCUSSION/SUMMARY
[FreeTextEntry1] : 1.  No additional cardiac testing at this time. Consider ambulatory monitoring if she has any more symptoms or evidence of more frequent PVCs. 2.  Continue current cardiac meds in doses as noted above for hypertension, PVCs and dyslipidemia including atorvastatin 20 mg daily. 3.  Monitor BP at home, keep a log and bring to f/u.  4.  Patient encouraged to work on diet to lose weight. 5.  Patient is encouraged to exercise at least 30 minutes a day everyday of the week. 6.  Follow up here in 6 months. [EKG obtained to assist in diagnosis and management of assessed problem(s)] : EKG obtained to assist in diagnosis and management of assessed problem(s)

## 2024-06-04 NOTE — HISTORY OF PRESENT ILLNESS
[FreeTextEntry1] : Patient presents back to the office today feeling well and offering no cardiovascular type complaints.  She continues to be active taking care of her grandson and also walking her new show well.  She is able to do all of that without any symptoms or limitations at all.  Her primary issues are with her back for which she has been receiving shots and that seems to be helping.  Blood pressures have been in the 120s over 70s although she forgot her list today.  Patient denies chest pain, shortness of breath, palpitations, orthopnea, presyncope, syncope.

## 2024-06-04 NOTE — ASSESSMENT
[FreeTextEntry1] : Echocardiogram December 22, 2021 demonstrated left ventricle normal size and function with ejection fraction of 60 to 65%. Mild to moderate mitral, trace aortic, mild tricuspid and trace pulmonic regurgitation noted.  Holter monitor December 15, 2021 demonstrated presenting rhythm is sinus with an average heart rate of 78 bpm, maximum 108, minimum 53 bpm. Frequent PVCs were noted with a total of 306/h with bigeminy, trigeminy and 4 multifocal couplets. 12 total PACs were noted. Patient reported no symptoms during the Holter.  Holter monitor March 16, 2022 demonstrated presenting rhythm was sinus with an average heart rate of 70 bpm, maximum 98, minimum 50 bpm. PVCs totaling 343/h were noted with some bigeminy and some trigeminy. Rare PACs.  Holter monitor June 21, 2022 presenting rhythm is sinus with an average heart rate of 61 bpm, maximum 89, minimum 47 bpm. PVCs totaling 209/h were noted with some and trigeminy. Total burden is just under 6%. No other significant arrhythmia.  Event monitor September 7 to September 21, 2022 showed a presenting rhythm of sinus with an average heart rate of 67 bpm, maximum 138, minimum 42 bpm. Frequent PVCs noted totaling 7.79% of the total beats. No other significant arrhythmia.  Echocardiogram October 24, 2023 demonstrates left ventricle normal size and function with ejection fraction of 55 to 60%.  Mild mitral, trace aortic, mild tricuspid and trace pulmonic regurgitation is noted.  EKG: Sinus rhythm with no significant ST or T wave changes. Poor R-wave progression.   79-year-old female with a past medical history of hypertension, dyslipidemia, frequent PVCs, obesity who presents today for follow-up evaluation. Patient appears to be doing well at this time. She is asymptomatic from a cardiac standpoint.  No evidence of significant PVCs.  I will hold off on additional monitoring for now but may consider that in the future and certainly if she has any symptoms.  No evidence of heart for any ischemia at this time.  EKG is otherwise unremarkable.  Blood pressures appear to be adequately controlled.  Most recent blood work shows good control of her lipids.

## 2024-06-05 ENCOUNTER — RX RENEWAL (OUTPATIENT)
Age: 79
End: 2024-06-05

## 2024-06-05 RX ORDER — LEVOCETIRIZINE DIHYDROCHLORIDE 5 MG/1
5 TABLET ORAL DAILY
Qty: 90 | Refills: 3 | Status: ACTIVE | COMMUNITY
Start: 2022-06-09 | End: 1900-01-01

## 2024-08-14 ENCOUNTER — OFFICE (OUTPATIENT)
Dept: URBAN - METROPOLITAN AREA CLINIC 115 | Facility: CLINIC | Age: 79
Setting detail: OPHTHALMOLOGY
End: 2024-08-14
Payer: MEDICARE

## 2024-08-14 DIAGNOSIS — H25.13: ICD-10-CM

## 2024-08-14 DIAGNOSIS — H16.223: ICD-10-CM

## 2024-08-14 DIAGNOSIS — H40.013: ICD-10-CM

## 2024-08-14 DIAGNOSIS — H52.4: ICD-10-CM

## 2024-08-14 DIAGNOSIS — H43.813: ICD-10-CM

## 2024-08-14 PROCEDURE — 92250 FUNDUS PHOTOGRAPHY W/I&R: CPT | Performed by: OPTOMETRIST

## 2024-08-14 PROCEDURE — 92015 DETERMINE REFRACTIVE STATE: CPT | Performed by: OPTOMETRIST

## 2024-08-14 PROCEDURE — 92014 COMPRE OPH EXAM EST PT 1/>: CPT | Performed by: OPTOMETRIST

## 2024-08-14 ASSESSMENT — CONFRONTATIONAL VISUAL FIELD TEST (CVF)
OD_FINDINGS: FULL
OS_FINDINGS: FULL

## 2024-08-15 ENCOUNTER — APPOINTMENT (OUTPATIENT)
Dept: GASTROENTEROLOGY | Facility: CLINIC | Age: 79
End: 2024-08-15
Payer: MEDICARE

## 2024-08-15 VITALS
OXYGEN SATURATION: 98 % | RESPIRATION RATE: 16 BRPM | HEIGHT: 65 IN | SYSTOLIC BLOOD PRESSURE: 136 MMHG | DIASTOLIC BLOOD PRESSURE: 84 MMHG | HEART RATE: 73 BPM | WEIGHT: 176 LBS | BODY MASS INDEX: 29.32 KG/M2

## 2024-08-15 PROCEDURE — 99213 OFFICE O/P EST LOW 20 MIN: CPT

## 2024-08-15 NOTE — HISTORY OF PRESENT ILLNESS
[FreeTextEntry1] : Patient with a history of IBS high cholesterol basal cell carcinoma  Patient with IBS.  Specifically bloating.  After eating.  Better with Benefiber and Levsin twice daily as well as probiotics.  She had no improvement with FODMAP diet.  SIBO test was negative.  Remote history of colon polyps.  Colonoscopy 2022 negative. October 2023 hemoglobin 13.2 LFTs normal

## 2024-08-15 NOTE — ASSESSMENT
[FreeTextEntry1] : A/P Patient with IBS-bloating Continue current treatment Benefiber, Levsin 0.125 twice daily renewed, probiotic Pt with bloating,  High-fiber diet Follow-up 6

## 2024-09-04 DIAGNOSIS — K58.9 IRRITABLE BOWEL SYNDROME W/OUT DIARRHEA: ICD-10-CM

## 2024-09-04 RX ORDER — HYOSCYAMINE SULFATE 0.12 MG/1
0.12 TABLET, ORALLY DISINTEGRATING ORAL 4 TIMES DAILY
Qty: 360 | Refills: 3 | Status: ACTIVE | COMMUNITY
Start: 2024-09-04 | End: 1900-01-01

## 2024-09-25 ENCOUNTER — RX RENEWAL (OUTPATIENT)
Age: 79
End: 2024-09-25

## 2024-10-14 ENCOUNTER — APPOINTMENT (OUTPATIENT)
Dept: INTERNAL MEDICINE | Facility: CLINIC | Age: 79
End: 2024-10-14
Payer: MEDICARE

## 2024-10-14 VITALS
BODY MASS INDEX: 29.05 KG/M2 | DIASTOLIC BLOOD PRESSURE: 85 MMHG | HEART RATE: 71 BPM | WEIGHT: 174.38 LBS | SYSTOLIC BLOOD PRESSURE: 134 MMHG | HEIGHT: 65 IN

## 2024-10-14 DIAGNOSIS — Z23 ENCOUNTER FOR IMMUNIZATION: ICD-10-CM

## 2024-10-14 DIAGNOSIS — E78.5 HYPERLIPIDEMIA, UNSPECIFIED: ICD-10-CM

## 2024-10-14 DIAGNOSIS — Z00.00 ENCOUNTER FOR GENERAL ADULT MEDICAL EXAMINATION W/OUT ABNORMAL FINDINGS: ICD-10-CM

## 2024-10-14 DIAGNOSIS — R79.9 ABNORMAL FINDING OF BLOOD CHEMISTRY, UNSPECIFIED: ICD-10-CM

## 2024-10-14 DIAGNOSIS — I10 ESSENTIAL (PRIMARY) HYPERTENSION: ICD-10-CM

## 2024-10-14 DIAGNOSIS — R53.83 OTHER FATIGUE: ICD-10-CM

## 2024-10-14 PROCEDURE — G0008: CPT

## 2024-10-14 PROCEDURE — G0439: CPT

## 2024-10-14 PROCEDURE — 36415 COLL VENOUS BLD VENIPUNCTURE: CPT

## 2024-10-14 PROCEDURE — 90662 IIV NO PRSV INCREASED AG IM: CPT

## 2024-10-15 LAB
ALBUMIN SERPL ELPH-MCNC: 4.1 G/DL
ALP BLD-CCNC: 95 U/L
ALT SERPL-CCNC: 15 U/L
ANION GAP SERPL CALC-SCNC: 14 MMOL/L
AST SERPL-CCNC: 19 U/L
BASOPHILS # BLD AUTO: 0.04 K/UL
BASOPHILS NFR BLD AUTO: 0.6 %
BILIRUB SERPL-MCNC: 0.4 MG/DL
BUN SERPL-MCNC: 15 MG/DL
CALCIUM SERPL-MCNC: 9.8 MG/DL
CHLORIDE SERPL-SCNC: 103 MMOL/L
CHOLEST SERPL-MCNC: 190 MG/DL
CO2 SERPL-SCNC: 26 MMOL/L
CREAT SERPL-MCNC: 0.99 MG/DL
EGFR: 58 ML/MIN/1.73M2
EOSINOPHIL # BLD AUTO: 0.24 K/UL
EOSINOPHIL NFR BLD AUTO: 3.9 %
ESTIMATED AVERAGE GLUCOSE: 108 MG/DL
GLUCOSE SERPL-MCNC: 88 MG/DL
HBA1C MFR BLD HPLC: 5.4 %
HCT VFR BLD CALC: 43.6 %
HDLC SERPL-MCNC: 58 MG/DL
HGB BLD-MCNC: 13.6 G/DL
IMM GRANULOCYTES NFR BLD AUTO: 0.5 %
LDLC SERPL CALC-MCNC: 99 MG/DL
LYMPHOCYTES # BLD AUTO: 1.8 K/UL
LYMPHOCYTES NFR BLD AUTO: 29.1 %
MAGNESIUM SERPL-MCNC: 1.7 MG/DL
MAN DIFF?: NORMAL
MCHC RBC-ENTMCNC: 31.2 GM/DL
MCHC RBC-ENTMCNC: 31.3 PG
MCV RBC AUTO: 100.2 FL
MONOCYTES # BLD AUTO: 0.39 K/UL
MONOCYTES NFR BLD AUTO: 6.3 %
NEUTROPHILS # BLD AUTO: 3.68 K/UL
NEUTROPHILS NFR BLD AUTO: 59.6 %
NONHDLC SERPL-MCNC: 132 MG/DL
PLATELET # BLD AUTO: 222 K/UL
POTASSIUM SERPL-SCNC: 4.3 MMOL/L
PROT SERPL-MCNC: 6.5 G/DL
RBC # BLD: 4.35 M/UL
RBC # FLD: 12.9 %
SODIUM SERPL-SCNC: 143 MMOL/L
TRIGL SERPL-MCNC: 194 MG/DL
TSH SERPL-ACNC: 1.83 UIU/ML
WBC # FLD AUTO: 6.18 K/UL

## 2024-12-09 ENCOUNTER — APPOINTMENT (OUTPATIENT)
Dept: CARDIOLOGY | Facility: CLINIC | Age: 79
End: 2024-12-09
Payer: MEDICARE

## 2024-12-09 ENCOUNTER — NON-APPOINTMENT (OUTPATIENT)
Age: 79
End: 2024-12-09

## 2024-12-09 VITALS
HEIGHT: 65 IN | SYSTOLIC BLOOD PRESSURE: 144 MMHG | HEART RATE: 75 BPM | WEIGHT: 174 LBS | DIASTOLIC BLOOD PRESSURE: 80 MMHG | BODY MASS INDEX: 28.99 KG/M2 | RESPIRATION RATE: 16 BRPM

## 2024-12-09 DIAGNOSIS — I49.3 VENTRICULAR PREMATURE DEPOLARIZATION: ICD-10-CM

## 2024-12-09 DIAGNOSIS — E78.5 HYPERLIPIDEMIA, UNSPECIFIED: ICD-10-CM

## 2024-12-09 DIAGNOSIS — I10 ESSENTIAL (PRIMARY) HYPERTENSION: ICD-10-CM

## 2024-12-09 PROCEDURE — G2211 COMPLEX E/M VISIT ADD ON: CPT

## 2024-12-09 PROCEDURE — 99214 OFFICE O/P EST MOD 30 MIN: CPT

## 2024-12-09 PROCEDURE — 93000 ELECTROCARDIOGRAM COMPLETE: CPT

## 2025-01-24 ENCOUNTER — APPOINTMENT (OUTPATIENT)
Dept: DERMATOLOGY | Facility: CLINIC | Age: 80
End: 2025-01-24
Payer: MEDICARE

## 2025-01-24 PROCEDURE — 99213 OFFICE O/P EST LOW 20 MIN: CPT

## 2025-05-07 ENCOUNTER — APPOINTMENT (OUTPATIENT)
Dept: ORTHOPEDIC SURGERY | Facility: CLINIC | Age: 80
End: 2025-05-07

## 2025-06-02 ENCOUNTER — RX RENEWAL (OUTPATIENT)
Age: 80
End: 2025-06-02

## 2025-08-15 ENCOUNTER — OFFICE (OUTPATIENT)
Dept: URBAN - METROPOLITAN AREA CLINIC 115 | Facility: CLINIC | Age: 80
Setting detail: OPHTHALMOLOGY
End: 2025-08-15
Payer: MEDICARE

## 2025-08-15 DIAGNOSIS — H25.13: ICD-10-CM

## 2025-08-15 DIAGNOSIS — H43.813: ICD-10-CM

## 2025-08-15 DIAGNOSIS — H40.013: ICD-10-CM

## 2025-08-15 PROCEDURE — 92012 INTRM OPH EXAM EST PATIENT: CPT | Performed by: OPTOMETRIST

## 2025-08-15 PROCEDURE — 92083 EXTENDED VISUAL FIELD XM: CPT | Performed by: OPTOMETRIST

## 2025-08-15 PROCEDURE — 92250 FUNDUS PHOTOGRAPHY W/I&R: CPT | Performed by: OPTOMETRIST

## 2025-08-15 ASSESSMENT — REFRACTION_AUTOREFRACTION
OS_SPHERE: +0.75
OD_SPHERE: +1.00
OD_CYLINDER: -3.00
OD_AXIS: 104
OS_CYLINDER: -2.75
OS_AXIS: 087

## 2025-08-15 ASSESSMENT — REFRACTION_CURRENTRX
OD_ADD: +2.25
OS_CYLINDER: -3.00
OS_VPRISM_DIRECTION: PROGS
OS_AXIS: 091
OD_VPRISM_DIRECTION: SV
OD_CYLINDER: -3.25
OS_OVR_VA: 20/
OS_ADD: +2.25
OS_CYLINDER: -1.75
OS_VPRISM_DIRECTION: SV
OS_AXIS: 082
OD_AXIS: 099
OD_OVR_VA: 20/
OD_CYLINDER: -2.00
OD_AXIS: 100
OS_OVR_VA: 20/
OS_SPHERE: +1.00
OD_SPHERE: +1.25
OD_SPHERE: +1.25
OD_VPRISM_DIRECTION: PROGS
OS_SPHERE: +1.00
OD_OVR_VA: 20/

## 2025-08-15 ASSESSMENT — PACHYMETRY
OD_CT_CORRECTION: -1
OS_CT_UM: 575
OD_CT_UM: 564
OS_CT_CORRECTION: -2

## 2025-08-15 ASSESSMENT — SUPERFICIAL PUNCTATE KERATITIS (SPK)
OD_SPK: 1+
OS_SPK: 1+

## 2025-08-15 ASSESSMENT — CONFRONTATIONAL VISUAL FIELD TEST (CVF)
OD_FINDINGS: FULL
OS_FINDINGS: FULL

## 2025-08-15 ASSESSMENT — REFRACTION_MANIFEST
OD_CYLINDER: -3.25
OS_AXIS: 083
OD_ADD: +3.00
OS_SPHERE: +1.00
OS_CYLINDER: -3.00
OS_VA1: 20/50+2
OS_ADD: +3.00
OD_VA1: 20/30
OD_SPHERE: +1.25
OD_AXIS: 103

## 2025-08-15 ASSESSMENT — VISUAL ACUITY
OS_BCVA: 20/30-1
OD_BCVA: 20/40

## 2025-08-29 ENCOUNTER — RX RENEWAL (OUTPATIENT)
Age: 80
End: 2025-08-29